# Patient Record
Sex: MALE | Race: WHITE | Employment: OTHER | URBAN - METROPOLITAN AREA
[De-identification: names, ages, dates, MRNs, and addresses within clinical notes are randomized per-mention and may not be internally consistent; named-entity substitution may affect disease eponyms.]

---

## 2020-06-16 ENCOUNTER — HOSPITAL ENCOUNTER (EMERGENCY)
Age: 65
Discharge: HOME | End: 2020-06-17
Attending: EMERGENCY MEDICINE
Payer: COMMERCIAL

## 2020-06-16 PROCEDURE — 99284 EMERGENCY DEPT VISIT MOD MDM: CPT

## 2020-06-16 RX ORDER — DUTASTERIDE 0.5 MG/1
0.5 CAPSULE, LIQUID FILLED ORAL DAILY
COMMUNITY
End: 2021-08-09

## 2020-06-16 RX ORDER — M-VIT,TX,IRON,MINS/CALC/FOLIC 27MG-0.4MG
1 TABLET ORAL DAILY
COMMUNITY

## 2020-06-16 RX ORDER — TADALAFIL 5 MG/1
5 TABLET ORAL PRN
COMMUNITY

## 2020-06-16 SDOH — HEALTH STABILITY: MENTAL HEALTH: HOW OFTEN DO YOU HAVE A DRINK CONTAINING ALCOHOL?: NEVER

## 2020-06-17 ENCOUNTER — APPOINTMENT (OUTPATIENT)
Dept: GENERAL RADIOLOGY | Age: 65
End: 2020-06-17
Payer: COMMERCIAL

## 2020-06-17 ENCOUNTER — TELEPHONE (OUTPATIENT)
Dept: GASTROENTEROLOGY | Age: 65
End: 2020-06-17

## 2020-06-17 VITALS
TEMPERATURE: 99 F | WEIGHT: 202 LBS | BODY MASS INDEX: 28.92 KG/M2 | HEART RATE: 67 BPM | DIASTOLIC BLOOD PRESSURE: 96 MMHG | RESPIRATION RATE: 16 BRPM | HEIGHT: 70 IN | SYSTOLIC BLOOD PRESSURE: 169 MMHG | OXYGEN SATURATION: 97 %

## 2020-06-17 LAB
ABSOLUTE EOS #: 0.2 K/UL (ref 0–0.4)
ABSOLUTE IMMATURE GRANULOCYTE: ABNORMAL K/UL (ref 0–0.3)
ABSOLUTE LYMPH #: 1 K/UL (ref 1–4.8)
ABSOLUTE MONO #: 0.7 K/UL (ref 0.1–1.3)
ALBUMIN SERPL-MCNC: 4.2 G/DL (ref 3.5–5.2)
ALBUMIN/GLOBULIN RATIO: NORMAL (ref 1–2.5)
ALP BLD-CCNC: 55 U/L (ref 40–129)
ALT SERPL-CCNC: 18 U/L (ref 5–41)
ANION GAP SERPL CALCULATED.3IONS-SCNC: 12 MMOL/L (ref 9–17)
AST SERPL-CCNC: 16 U/L
BASOPHILS # BLD: 1 % (ref 0–2)
BASOPHILS ABSOLUTE: 0 K/UL (ref 0–0.2)
BILIRUB SERPL-MCNC: 0.34 MG/DL (ref 0.3–1.2)
BILIRUBIN DIRECT: 0.1 MG/DL
BILIRUBIN, INDIRECT: 0.24 MG/DL (ref 0–1)
BNP INTERPRETATION: NORMAL
BUN BLDV-MCNC: 22 MG/DL (ref 8–23)
BUN/CREAT BLD: ABNORMAL (ref 9–20)
CALCIUM SERPL-MCNC: 9.2 MG/DL (ref 8.6–10.4)
CHLORIDE BLD-SCNC: 105 MMOL/L (ref 98–107)
CO2: 22 MMOL/L (ref 20–31)
CREAT SERPL-MCNC: 0.79 MG/DL (ref 0.7–1.2)
DIFFERENTIAL TYPE: ABNORMAL
EKG ATRIAL RATE: 62 BPM
EKG P AXIS: 61 DEGREES
EKG P-R INTERVAL: 172 MS
EKG Q-T INTERVAL: 432 MS
EKG QRS DURATION: 84 MS
EKG QTC CALCULATION (BAZETT): 438 MS
EKG R AXIS: 62 DEGREES
EKG T AXIS: 64 DEGREES
EKG VENTRICULAR RATE: 62 BPM
EOSINOPHILS RELATIVE PERCENT: 3 % (ref 0–4)
GFR AFRICAN AMERICAN: >60 ML/MIN
GFR NON-AFRICAN AMERICAN: >60 ML/MIN
GFR SERPL CREATININE-BSD FRML MDRD: ABNORMAL ML/MIN/{1.73_M2}
GFR SERPL CREATININE-BSD FRML MDRD: ABNORMAL ML/MIN/{1.73_M2}
GLOBULIN: NORMAL G/DL (ref 1.5–3.8)
GLUCOSE BLD-MCNC: 139 MG/DL (ref 70–99)
HCT VFR BLD CALC: 47.7 % (ref 41–53)
HEMOGLOBIN: 16.1 G/DL (ref 13.5–17.5)
IMMATURE GRANULOCYTES: ABNORMAL %
LIPASE: 35 U/L (ref 13–60)
LYMPHOCYTES # BLD: 17 % (ref 24–44)
MAGNESIUM: 2.3 MG/DL (ref 1.6–2.6)
MCH RBC QN AUTO: 30.1 PG (ref 26–34)
MCHC RBC AUTO-ENTMCNC: 33.8 G/DL (ref 31–37)
MCV RBC AUTO: 89.1 FL (ref 80–100)
MONOCYTES # BLD: 11 % (ref 1–7)
NRBC AUTOMATED: ABNORMAL PER 100 WBC
PDW BLD-RTO: 13.4 % (ref 11.5–14.9)
PLATELET # BLD: 157 K/UL (ref 150–450)
PLATELET ESTIMATE: ABNORMAL
PMV BLD AUTO: 8.9 FL (ref 6–12)
POTASSIUM SERPL-SCNC: 4.1 MMOL/L (ref 3.7–5.3)
PRO-BNP: <20 PG/ML
RBC # BLD: 5.36 M/UL (ref 4.5–5.9)
RBC # BLD: ABNORMAL 10*6/UL
SEG NEUTROPHILS: 68 % (ref 36–66)
SEGMENTED NEUTROPHILS ABSOLUTE COUNT: 4.1 K/UL (ref 1.3–9.1)
SODIUM BLD-SCNC: 139 MMOL/L (ref 135–144)
TOTAL PROTEIN: 6.5 G/DL (ref 6.4–8.3)
TROPONIN INTERP: NORMAL
TROPONIN T: NORMAL NG/ML
TROPONIN, HIGH SENSITIVITY: <6 NG/L (ref 0–22)
WBC # BLD: 6 K/UL (ref 3.5–11)
WBC # BLD: ABNORMAL 10*3/UL

## 2020-06-17 PROCEDURE — 2580000003 HC RX 258: Performed by: EMERGENCY MEDICINE

## 2020-06-17 PROCEDURE — 80048 BASIC METABOLIC PNL TOTAL CA: CPT

## 2020-06-17 PROCEDURE — 36415 COLL VENOUS BLD VENIPUNCTURE: CPT

## 2020-06-17 PROCEDURE — 93005 ELECTROCARDIOGRAM TRACING: CPT | Performed by: EMERGENCY MEDICINE

## 2020-06-17 PROCEDURE — 71046 X-RAY EXAM CHEST 2 VIEWS: CPT

## 2020-06-17 PROCEDURE — 80076 HEPATIC FUNCTION PANEL: CPT

## 2020-06-17 PROCEDURE — 85025 COMPLETE CBC W/AUTO DIFF WBC: CPT

## 2020-06-17 PROCEDURE — 93010 ELECTROCARDIOGRAM REPORT: CPT | Performed by: INTERNAL MEDICINE

## 2020-06-17 PROCEDURE — 2500000003 HC RX 250 WO HCPCS: Performed by: EMERGENCY MEDICINE

## 2020-06-17 PROCEDURE — 96374 THER/PROPH/DIAG INJ IV PUSH: CPT

## 2020-06-17 PROCEDURE — 83735 ASSAY OF MAGNESIUM: CPT

## 2020-06-17 PROCEDURE — 6370000000 HC RX 637 (ALT 250 FOR IP): Performed by: EMERGENCY MEDICINE

## 2020-06-17 PROCEDURE — 84484 ASSAY OF TROPONIN QUANT: CPT

## 2020-06-17 PROCEDURE — 83880 ASSAY OF NATRIURETIC PEPTIDE: CPT

## 2020-06-17 PROCEDURE — 83690 ASSAY OF LIPASE: CPT

## 2020-06-17 RX ORDER — PANTOPRAZOLE SODIUM 20 MG/1
20 TABLET, DELAYED RELEASE ORAL DAILY
Qty: 15 TABLET | Refills: 0 | Status: SHIPPED | OUTPATIENT
Start: 2020-06-17 | End: 2020-06-18

## 2020-06-17 RX ORDER — 0.9 % SODIUM CHLORIDE 0.9 %
1000 INTRAVENOUS SOLUTION INTRAVENOUS ONCE
Status: COMPLETED | OUTPATIENT
Start: 2020-06-17 | End: 2020-06-17

## 2020-06-17 RX ORDER — PANTOPRAZOLE SODIUM 20 MG/1
20 TABLET, DELAYED RELEASE ORAL DAILY
Qty: 15 TABLET | Refills: 0 | Status: SHIPPED | OUTPATIENT
Start: 2020-06-17 | End: 2020-06-17 | Stop reason: SDUPTHER

## 2020-06-17 RX ORDER — MAGNESIUM HYDROXIDE/ALUMINUM HYDROXICE/SIMETHICONE 120; 1200; 1200 MG/30ML; MG/30ML; MG/30ML
30 SUSPENSION ORAL ONCE
Status: COMPLETED | OUTPATIENT
Start: 2020-06-17 | End: 2020-06-17

## 2020-06-17 RX ORDER — LIDOCAINE HYDROCHLORIDE 20 MG/ML
15 SOLUTION OROPHARYNGEAL ONCE
Status: COMPLETED | OUTPATIENT
Start: 2020-06-17 | End: 2020-06-17

## 2020-06-17 RX ADMIN — LIDOCAINE HYDROCHLORIDE 15 ML: 20 SOLUTION ORAL; TOPICAL at 00:42

## 2020-06-17 RX ADMIN — FAMOTIDINE 20 MG: 10 INJECTION, SOLUTION INTRAVENOUS at 00:42

## 2020-06-17 RX ADMIN — ALUMINUM HYDROXIDE, MAGNESIUM HYDROXIDE, AND SIMETHICONE 30 ML: 200; 200; 20 SUSPENSION ORAL at 00:42

## 2020-06-17 RX ADMIN — SODIUM CHLORIDE 1000 ML: 9 INJECTION, SOLUTION INTRAVENOUS at 00:42

## 2020-06-17 ASSESSMENT — ENCOUNTER SYMPTOMS
COUGH: 0
NAUSEA: 1
BACK PAIN: 0
SHORTNESS OF BREATH: 0
VOMITING: 0
ABDOMINAL PAIN: 1
DIARRHEA: 0

## 2020-06-17 NOTE — TELEPHONE ENCOUNTER
Received a call back and scheduled for 6/18/20 1 pm at the [facility]. Ins, id, co-pay, mask, no more than 5 minutes early, and preferably alone due to covid-19. Writer thanked and call ended.

## 2020-06-17 NOTE — TELEPHONE ENCOUNTER
Received referral, called patient to schedule but unfortunately it went to his .  Patient referral has a DX of Anna Sanchez and is referred to [facility] per PCP

## 2020-06-17 NOTE — ED PROVIDER NOTES
EMERGENCY DEPARTMENT ENCOUNTER    Pt Name: Elizabeth Jiménez  MRN: [id number]  [place] 1955  Date of evaluation: 6/16/20  CHIEF COMPLAINT       Chief Complaint   Patient presents with    Abdominal Pain    Nausea    Emesis     HISTORY OF PRESENT ILLNESS   HPI    This is a 71-year-old male with a history of diabetes who comes in today with epigastric abdominal pain. The patient states that this is chronic in nature. He states that he had this 2 years ago but then he started wearing bracelets and it went away. But recently he has had epigastric abdominal pain after eating. Today he ate a roast as well as some broccoli and he felt severe epigastric abdominal pain he went to the toilet and felt nauseous but did not actually vomit. Patient states that by the time EMS arrived he no longer had abdominal pain. Patient states that he has intermittently having diarrhea which he attributes to eating cereal and almond milk he states that when he was on a cruise for 2 weeks in February he no longer had the diarrhea but now he has the diarrhea intermittently again. He denies any fevers or chills no chest pain shortness of breath. REVIEW OF SYSTEMS     Review of Systems   Constitutional: Negative for fever. HENT: Negative for congestion. Respiratory: Negative for cough and shortness of breath. Cardiovascular: Negative for chest pain. Gastrointestinal: Positive for abdominal pain and nausea. Negative for diarrhea and vomiting. Genitourinary: Negative for dysuria. Musculoskeletal: Negative for back pain. Skin: Negative for rash. Neurological: Negative for headaches. All other systems reviewed and are negative.     PASTMEDICAL HISTORY     Past Medical History:   Diagnosis Date    Diabetes mellitus ([address]      SURGICAL HISTORY       Past Surgical History:   Procedure Laterality Date    CHOLECYSTECTOMY      HERNIA REPAIR      KNEE ARTHROSCOPY Right 2017     CURRENT MEDICATIONS       Previous

## 2020-06-18 ENCOUNTER — OFFICE VISIT (OUTPATIENT)
Dept: GASTROENTEROLOGY | Age: 65
End: 2020-06-18
Payer: COMMERCIAL

## 2020-06-18 VITALS — BODY MASS INDEX: 28.84 KG/M2 | TEMPERATURE: 98.2 F | WEIGHT: 201 LBS

## 2020-06-18 PROCEDURE — 99204 OFFICE O/P NEW MOD 45 MIN: CPT | Performed by: INTERNAL MEDICINE

## 2020-06-18 RX ORDER — OMEPRAZOLE 20 MG/1
20 CAPSULE, DELAYED RELEASE ORAL
Qty: 30 CAPSULE | Refills: 0 | Status: SHIPPED | OUTPATIENT
Start: 2020-06-18 | End: 2020-06-18

## 2020-06-18 RX ORDER — PANTOPRAZOLE SODIUM 20 MG/1
20 TABLET, DELAYED RELEASE ORAL DAILY
Qty: 30 TABLET | Refills: 3 | Status: SHIPPED | OUTPATIENT
Start: 2020-06-18 | End: 2020-10-13

## 2020-06-18 ASSESSMENT — ENCOUNTER SYMPTOMS
ANAL BLEEDING: 0
CHOKING: 0
VOICE CHANGE: 0
SORE THROAT: 0
RECTAL PAIN: 0
NAUSEA: 1
DIARRHEA: 1
ABDOMINAL PAIN: 0
ALLERGIC/IMMUNOLOGIC NEGATIVE: 1
BLOOD IN STOOL: 0
CONSTIPATION: 0
WHEEZING: 0
SINUS PRESSURE: 0
BACK PAIN: 0
COUGH: 0
VOMITING: 0
RESPIRATORY NEGATIVE: 1
TROUBLE SWALLOWING: 0
ABDOMINAL DISTENTION: 1

## 2020-06-18 NOTE — PROGRESS NOTES
Subjective:      Patient ID: Renee Berumen is a 60 y.o. male. HPI  Dr. Salina Melton has requested that I see Renee Berumen for a consult for No diagnosis found. .  Patient seen with a history of substernal discomfort. Patient states that in the last couple of months he is having intermittent substernal discomfort. Usually while eating food. He  thinks that while eating bread or solid food he has the issue. Usually this last for a.  Of 20 to 30 minutes. At that time he gets mild diaphoresis. No radiation of the discomfort. This discomfort is not activity induced. No prior history of angina. In fact, he does do heavy work, and at that time he does not have symptoms. He has good appetite. Denies heartburns. No dyspeptic symptoms. No history of NSAID use. No weight loss. He does have abdominal bloating, cramps, intermittent loose bowels alternating with constipation. Diarrhea appears to be predominant. No melena or hematochezia. Patient had a similar symptoms of substernal discomfort years ago. At that time he was told to use some metallic rings on his hands, and with that intervention symptoms resolved. No prior history of esophagitis, ulcer disease. He was seen in the emergency department recently with the symptoms and subsequently referred for GI evaluation. Past Medical, Family, and Social History reviewed and does contribute to the patient presenting condition. patient\"s PMH/PSH,SH,PSYCH hx, MEDs, ALLERGIES, and ROS was all reviewed and updated ion the appropriate sections  Review of Systems   Constitutional: Positive for fatigue. Negative for appetite change and unexpected weight change. HENT: Positive for postnasal drip. Negative for dental problem, sinus pressure, sore throat, trouble swallowing and voice change. Eyes: Positive for visual disturbance. Respiratory: Negative. Negative for cough, choking and wheezing.     Cardiovascular: Positive for chest

## 2020-06-18 NOTE — TELEPHONE ENCOUNTER
Joel Bateman stated at check out today that he received a notice from his pharmacy that his insurance is requesting an alternative for omeprazole.

## 2020-06-22 ENCOUNTER — HOSPITAL ENCOUNTER (OUTPATIENT)
Dept: PREADMISSION TESTING | Age: 65
Setting detail: SPECIMEN
Discharge: HOME | End: 2020-06-26
Payer: COMMERCIAL

## 2020-06-22 PROCEDURE — U0004 COV-19 TEST NON-CDC HGH THRU: HCPCS

## 2020-06-23 LAB
SARS-COV-2, PCR: NOT DETECTED
SARS-COV-2, RAPID: NORMAL
SARS-COV-2: NORMAL
SOURCE: NORMAL

## 2020-06-24 ENCOUNTER — HOSPITAL ENCOUNTER (OUTPATIENT)
Dept: NON INVASIVE DIAGNOSTICS | Age: 65
Discharge: HOME | End: 2020-06-24
Payer: COMMERCIAL

## 2020-06-24 ENCOUNTER — TELEPHONE (OUTPATIENT)
Dept: PRIMARY CARE CLINIC | Age: 65
End: 2020-06-24

## 2020-06-24 PROCEDURE — 93017 CV STRESS TEST TRACING ONLY: CPT

## 2020-06-24 PROCEDURE — 2580000003 HC RX 258: Performed by: INTERNAL MEDICINE

## 2020-06-24 RX ORDER — SODIUM CHLORIDE 0.9 % (FLUSH) 0.9 %
10 SYRINGE (ML) INJECTION 2 TIMES DAILY
Status: DISCONTINUED | OUTPATIENT
Start: 2020-06-24 | End: 2020-06-25 | Stop reason: HOSPADM

## 2020-06-24 RX ADMIN — Medication 10 ML: at 09:13

## 2020-06-25 ENCOUNTER — TELEPHONE (OUTPATIENT)
Dept: GASTROENTEROLOGY | Age: 65
End: 2020-06-25

## 2020-06-26 ENCOUNTER — HOSPITAL ENCOUNTER (OUTPATIENT)
Age: 65
Setting detail: OUTPATIENT SURGERY
Discharge: HOME | End: 2020-06-26
Attending: INTERNAL MEDICINE | Admitting: INTERNAL MEDICINE
Payer: COMMERCIAL

## 2020-06-26 ENCOUNTER — ANESTHESIA (OUTPATIENT)
Dept: ENDOSCOPY | Age: 65
End: 2020-06-26
Payer: COMMERCIAL

## 2020-06-26 ENCOUNTER — ANESTHESIA EVENT (OUTPATIENT)
Dept: ENDOSCOPY | Age: 65
End: 2020-06-26
Payer: COMMERCIAL

## 2020-06-26 VITALS
OXYGEN SATURATION: 97 % | HEART RATE: 56 BPM | WEIGHT: 201 LBS | DIASTOLIC BLOOD PRESSURE: 78 MMHG | SYSTOLIC BLOOD PRESSURE: 160 MMHG | HEIGHT: 70 IN | RESPIRATION RATE: 16 BRPM | TEMPERATURE: 97.5 F | BODY MASS INDEX: 28.77 KG/M2

## 2020-06-26 VITALS — DIASTOLIC BLOOD PRESSURE: 76 MMHG | SYSTOLIC BLOOD PRESSURE: 177 MMHG | OXYGEN SATURATION: 95 %

## 2020-06-26 LAB — GLUCOSE BLD-MCNC: 128 MG/DL (ref 75–110)

## 2020-06-26 PROCEDURE — 3609017700 HC EGD DILATION GASTRIC/DUODENAL STRICTURE: Performed by: INTERNAL MEDICINE

## 2020-06-26 PROCEDURE — 3700000001 HC ADD 15 MINUTES (ANESTHESIA): Performed by: INTERNAL MEDICINE

## 2020-06-26 PROCEDURE — 7100000001 HC PACU RECOVERY - ADDTL 15 MIN: Performed by: INTERNAL MEDICINE

## 2020-06-26 PROCEDURE — 43450 DILATE ESOPHAGUS 1/MULT PASS: CPT | Performed by: INTERNAL MEDICINE

## 2020-06-26 PROCEDURE — 88305 TISSUE EXAM BY PATHOLOGIST: CPT

## 2020-06-26 PROCEDURE — 7100000000 HC PACU RECOVERY - FIRST 15 MIN: Performed by: INTERNAL MEDICINE

## 2020-06-26 PROCEDURE — 2580000003 HC RX 258: Performed by: INTERNAL MEDICINE

## 2020-06-26 PROCEDURE — 82947 ASSAY GLUCOSE BLOOD QUANT: CPT

## 2020-06-26 PROCEDURE — 2709999900 HC NON-CHARGEABLE SUPPLY: Performed by: INTERNAL MEDICINE

## 2020-06-26 PROCEDURE — 43239 EGD BIOPSY SINGLE/MULTIPLE: CPT | Performed by: INTERNAL MEDICINE

## 2020-06-26 PROCEDURE — 6360000002 HC RX W HCPCS: Performed by: NURSE ANESTHETIST, CERTIFIED REGISTERED

## 2020-06-26 PROCEDURE — 3700000000 HC ANESTHESIA ATTENDED CARE: Performed by: INTERNAL MEDICINE

## 2020-06-26 RX ORDER — ONDANSETRON 2 MG/ML
4 INJECTION INTRAMUSCULAR; INTRAVENOUS
Status: DISCONTINUED | OUTPATIENT
Start: 2020-06-26 | End: 2020-06-26 | Stop reason: HOSPADM

## 2020-06-26 RX ORDER — MEPERIDINE HYDROCHLORIDE 25 MG/ML
12.5 INJECTION INTRAMUSCULAR; INTRAVENOUS; SUBCUTANEOUS EVERY 5 MIN PRN
Status: DISCONTINUED | OUTPATIENT
Start: 2020-06-26 | End: 2020-06-26 | Stop reason: HOSPADM

## 2020-06-26 RX ORDER — LABETALOL 20 MG/4 ML (5 MG/ML) INTRAVENOUS SYRINGE
5 EVERY 10 MIN PRN
Status: DISCONTINUED | OUTPATIENT
Start: 2020-06-26 | End: 2020-06-26 | Stop reason: HOSPADM

## 2020-06-26 RX ORDER — PROPOFOL 10 MG/ML
INJECTION, EMULSION INTRAVENOUS PRN
Status: DISCONTINUED | OUTPATIENT
Start: 2020-06-26 | End: 2020-06-26 | Stop reason: SDUPTHER

## 2020-06-26 RX ORDER — DIPHENHYDRAMINE HYDROCHLORIDE 50 MG/ML
12.5 INJECTION INTRAMUSCULAR; INTRAVENOUS
Status: DISCONTINUED | OUTPATIENT
Start: 2020-06-26 | End: 2020-06-26 | Stop reason: HOSPADM

## 2020-06-26 RX ORDER — ZOLPIDEM TARTRATE 5 MG/1
5 TABLET ORAL
COMMUNITY
End: 2021-11-30 | Stop reason: DRUGHIGH

## 2020-06-26 RX ORDER — SODIUM CHLORIDE, SODIUM LACTATE, POTASSIUM CHLORIDE, CALCIUM CHLORIDE 600; 310; 30; 20 MG/100ML; MG/100ML; MG/100ML; MG/100ML
INJECTION, SOLUTION INTRAVENOUS CONTINUOUS
Status: DISCONTINUED | OUTPATIENT
Start: 2020-06-26 | End: 2020-06-26 | Stop reason: HOSPADM

## 2020-06-26 RX ORDER — MORPHINE SULFATE 2 MG/ML
2 INJECTION, SOLUTION INTRAMUSCULAR; INTRAVENOUS EVERY 5 MIN PRN
Status: DISCONTINUED | OUTPATIENT
Start: 2020-06-26 | End: 2020-06-26 | Stop reason: HOSPADM

## 2020-06-26 RX ADMIN — SODIUM CHLORIDE, POTASSIUM CHLORIDE, SODIUM LACTATE AND CALCIUM CHLORIDE: 600; 310; 30; 20 INJECTION, SOLUTION INTRAVENOUS at 07:43

## 2020-06-26 RX ADMIN — PROPOFOL 50 MG: 10 INJECTION, EMULSION INTRAVENOUS at 07:47

## 2020-06-26 RX ADMIN — PROPOFOL 50 MG: 10 INJECTION, EMULSION INTRAVENOUS at 07:44

## 2020-06-26 RX ADMIN — PROPOFOL 50 MG: 10 INJECTION, EMULSION INTRAVENOUS at 07:51

## 2020-06-26 RX ADMIN — PROPOFOL 50 MG: 10 INJECTION, EMULSION INTRAVENOUS at 07:46

## 2020-06-26 RX ADMIN — SODIUM CHLORIDE, POTASSIUM CHLORIDE, SODIUM LACTATE AND CALCIUM CHLORIDE: 600; 310; 30; 20 INJECTION, SOLUTION INTRAVENOUS at 06:39

## 2020-06-26 RX ADMIN — PROPOFOL 50 MG: 10 INJECTION, EMULSION INTRAVENOUS at 07:50

## 2020-06-26 ASSESSMENT — PULMONARY FUNCTION TESTS
PIF_VALUE: 0
PIF_VALUE: 1
PIF_VALUE: 0
PIF_VALUE: 1
PIF_VALUE: 0

## 2020-06-26 ASSESSMENT — PAIN SCALES - GENERAL: PAINLEVEL_OUTOF10: 0

## 2020-06-26 ASSESSMENT — ENCOUNTER SYMPTOMS
CONSTIPATION: 0
VOMITING: 0
STRIDOR: 0
SHORTNESS OF BREATH: 0
COUGH: 0
ABDOMINAL PAIN: 1
DIARRHEA: 1
ABDOMINAL DISTENTION: 1
WHEEZING: 0
NAUSEA: 1
SHORTNESS OF BREATH: 0

## 2020-06-26 ASSESSMENT — LIFESTYLE VARIABLES: SMOKING_STATUS: 0

## 2020-06-26 ASSESSMENT — PAIN - FUNCTIONAL ASSESSMENT: PAIN_FUNCTIONAL_ASSESSMENT: 0-10

## 2020-06-26 NOTE — H&P
HISTORY and Susi Mukherjee [id number]       NAME:  Lynda Fagan  MRN: [id number]   YOB: 1955   Date: 6/26/2020   Age: 60 y.o. Gender: male       COMPLAINT AND PRESENT HISTORY:              Lynda Fagan is 60 y.o.,  male, here for EGD. Patient C/O of frequent heartburn, epigastric pains, nausea worsening in the last 2-3 weeks. No diarrhea / constipation, no changes in the color, caliber or consistency of the stools. No fever or chills. He reports the symptoms worsened last week and he had his wife call 911. Pt reports he had chest discomfort with eating and went to ED. He reports he had stress testing which pt reports were normal.    Pt went on a cruise in Feb, he had recurrent diarrhea after travel. Pt reports he saw Dr. Masoud Bean 6/18/2020, states no change in health since office visit.      PAST MEDICAL HISTORY     Past Medical History:   Diagnosis Date    Diabetes mellitus ([facility] [address]     prediabetes    History of BPH     Mitral valve prolapse        SURGICAL HISTORY       Past Surgical History:   Procedure Laterality Date    CHOLECYSTECTOMY      HERNIA REPAIR      x 3    KNEE ARTHROSCOPY Right 2017         SOCIAL HISTORY       Social History     Socioeconomic History    Marital status: Single     Spouse name: None    Number of children: None    Years of education: None    Highest education level: None   Occupational History    None   Social Needs    Financial resource strain: None    Food insecurity     Worry: None     Inability: None    Transportation needs     Medical: None     Non-medical: None   Tobacco Use    Smoking status: Never Smoker    Smokeless tobacco: Never Used   Substance and Sexual Activity    Alcohol use: Never     Frequency: Never    Drug use: Never    Sexual activity: None   Lifestyle    Physical activity     Days per week: None     Minutes per session: None    Stress: None   Relationships    Social connections     Talks on distress or pain at this time. SKIN:  Warm, dry, no cyanosis or jaundice. HEAD:  Normocephalic, atraumatic, no swelling or tenderness. EYES:  Conjunctiva clear sclera white, wears glasses. EARS:  No discharge, no marked hearing loss. NOSE:  No rhinorrhea, epistaxis or septal deformity. THROAT:  Uvula midline. No ulceration bleeding or discharge. NECK:  No stiffness, trachea central.          CHEST:  Symmetrical and equal on expansion. HEART:  Hypertensive. RRR S1 > S2. No audible murmurs or gallops. LUNGS:  Equal on expansion, normal breath sounds. ABDOMEN:  oft on palpation. Tenderness to epigastrium. Normoactive BS. No guarding or rigidity. No palpable organomegaly. LOCOMOTOR, BACK AND SPINE:  No tenderness or deformities. EXTREMITIES:  Symmetrical, no pedal edema. No calf tenderness. No discoloration or ulcerations. NEUROLOGIC:  The patient is conscious, alert, oriented, No apparent focal sensory or motor deficits. PROVISIONAL DIAGNOSES / SURGERY:      Substernal discomfort.    EGD    MICHAEL Garcia - CNP on 6/26/2020 at 6:33 AM

## 2020-06-26 NOTE — ANESTHESIA POSTPROCEDURE EVALUATION
POST- ANESTHESIA EVALUATION       Pt Name: Martita Barakat  MRN: [id number]  YOB: 1955  Date of evaluation: 6/26/2020  Time:  9:13 AM      BP (!) 160/78   Pulse 56   Temp 97.5 °F (36.4 °C) (Infrared)   Resp 16   Ht 5' 10\" (1.778 m)   Wt 201 lb (91.2 kg)   SpO2 97%   BMI 28.84 kg/m²      Consciousness Level  Awake  Cardiopulmonary Status  Stable  Pain Adequately Treated YES  Nausea / Vomiting  NO  Adequate Hydration  YES  Anesthesia Related Complications NONE      Electronically signed by Steven Disla MD on 6/26/2020 at 9:13 AM       Department of Anesthesiology  Postprocedure Note    Patient: Martita Barakat  MRN: [id number]  YOB: 1955  Date of evaluation: 6/26/2020  Time:  9:13 AM     Procedure Summary     Date:  06/26/20 Room / Location:  [address] [place] [address] 03 / [address] [place] [address] ENDO    Anesthesia Start:  3989 Anesthesia Stop:  0801    Procedure:  EGD DILATION & BIOPSY (N/A Esophagus) Diagnosis:  (SUBSTERNAL DISCOMFORT          PAT ON ADMIT PER ANES)    Surgeon:  Jerica Reyes MD Responsible Provider:  Steven Disla MD    Anesthesia Type:  MAC, general ASA Status:  2          Anesthesia Type: MAC, general    Sandy Phase I: Sandy Score: 10    Sandy Phase II:      Last vitals: Reviewed and per EMR flowsheets.        Anesthesia Post Evaluation

## 2020-06-29 LAB — SURGICAL PATHOLOGY REPORT: NORMAL

## 2020-06-29 NOTE — PROCEDURES
[address] [address]                              CARDIAC STRESS TEST    PATIENT NAME: Kike Tellez                   :        1955  MED REC NO:   [id number]                              ROOM:  ACCOUNT NO:   [de-identified]                           ADMIT DATE: 2020  PROVIDER:     Treasure Bettencourt    DATE OF STUDY:  2020    TEST TYPE: TREADMILL STRESS TEST  INDICATION: CHEST PAIN, PRE-OP  REFERRING PHYSICIAN: Kelli Wright    100% MAX PREDICTED HEART RATE: 156 BEATS PER MINUTE  85% MAX PREDICTED HEART RATE: 132 BEATS PER MINUTE  RESTING HEART RATE: 55 BEATS PER MINUTE  MAXIMUM HEART RATE ACHIEVED: 139 BEATS PER MINUTE  PERCENTAGE OF AGE PREDICTED MAXIMUM ACHIEVED: 89%  RESTING BLOOD PRESSURE: 156/82  PEAK BLOOD PRESSURE: 219/83  METS: 10.1    MEDICATION(S) GIVEN: NONE  REASON FOR TERMINATION: 85% OF MAXIMUM PREDICTED HEART RATE ACHIEVED,  FATIGUE  TOTAL TIME ON TREADMILL: 8:19    RESTING EKG: NORMAL  STRESS HEART RESPONSE: NORMAL RESPONSE  BLOOD PRESSURE RESPONSE: HYPER  STRESS EKGs: NORMAL  CHEST DISCOMFORT: NO PAIN DURING STRESS  ISCHEMIC EKG CHANGES: NONE    EKG IMPRESSION: ELECTROCARDIOGRAPHICALLY NEGATIVE TREADMILL STRESS TEST. COMMENTS: ONE VENTRICULAR COUPLET, MAXIMUM BLOOD PRESSURE 219/83.     Edwin Ballard    D: 2020 13:39:10       T: 2020 13:42:54     /NAIN  Job#: [id number]     Doc#: Unknown    CC:    (Retain this field even if not dictated or not decipherable)

## 2020-07-09 ENCOUNTER — TELEPHONE (OUTPATIENT)
Dept: GASTROENTEROLOGY | Age: 65
End: 2020-07-09

## 2020-07-14 ENCOUNTER — OFFICE VISIT (OUTPATIENT)
Dept: GASTROENTEROLOGY | Age: 65
End: 2020-07-14
Payer: COMMERCIAL

## 2020-07-14 VITALS — WEIGHT: 206.1 LBS | BODY MASS INDEX: 29.57 KG/M2

## 2020-07-14 PROCEDURE — 99213 OFFICE O/P EST LOW 20 MIN: CPT | Performed by: NURSE PRACTITIONER

## 2020-07-14 ASSESSMENT — ENCOUNTER SYMPTOMS
COUGH: 0
ALLERGIC/IMMUNOLOGIC NEGATIVE: 1
WHEEZING: 0
CONSTIPATION: 0
BLOOD IN STOOL: 0
TROUBLE SWALLOWING: 0
DIARRHEA: 1
VOICE CHANGE: 0
SINUS PRESSURE: 0
RESPIRATORY NEGATIVE: 1
CHOKING: 0
ABDOMINAL PAIN: 0
NAUSEA: 0
BACK PAIN: 0
ABDOMINAL DISTENTION: 0
VOMITING: 0
SORE THROAT: 0
ANAL BLEEDING: 0
RECTAL PAIN: 0

## 2020-07-14 NOTE — PROGRESS NOTES
mg by mouth daily, Disp: , Rfl:     tadalafil (CIALIS) 5 MG tablet, Take 5 mg by mouth as needed for Erectile Dysfunction, Disp: , Rfl:     ALLERGIES:   No Known Allergies    FAMILY HISTORY: History reviewed. No pertinent family history. SOCIAL HISTORY:   Social History     Socioeconomic History    Marital status: Single     Spouse name: Not on file    Number of children: Not on file    Years of education: Not on file    Highest education level: Not on file   Occupational History    Not on file   Social Needs    Financial resource strain: Not on file    Food insecurity     Worry: Not on file     Inability: Not on file    Transportation needs     Medical: Not on file     Non-medical: Not on file   Tobacco Use    Smoking status: Never Smoker    Smokeless tobacco: Never Used   Substance and Sexual Activity    Alcohol use: Never     Frequency: Never    Drug use: Never    Sexual activity: Not on file   Lifestyle    Physical activity     Days per week: Not on file     Minutes per session: Not on file    Stress: Not on file   Relationships    Social connections     Talks on phone: Not on file     Gets together: Not on file     Attends Yarsanism service: Not on file     Active member of club or organization: Not on file     Attends meetings of clubs or organizations: Not on file     Relationship status: Not on file    Intimate partner violence     Fear of current or ex partner: Not on file     Emotionally abused: Not on file     Physically abused: Not on file     Forced sexual activity: Not on file   Other Topics Concern    Not on file   Social History Narrative    Not on file       REVIEW OF SYSTEMS: A 12-point review of systemswas obtained and pertinent positives and negatives were enumerated above in the history of present illness. All other reviewed systems / symptoms were negative. Review of Systems   Constitutional: Positive for fatigue.  Negative for appetite change and unexpected weight Pulmonary:      Effort: Pulmonary effort is normal. No respiratory distress. Breath sounds: Normal breath sounds. No wheezing or rales. Abdominal:      General: Bowel sounds are normal. There is no distension. Palpations: Abdomen is soft. There is no hepatomegaly or mass. Tenderness: There is no abdominal tenderness. There is no guarding or rebound. Hernia: No hernia is present. Lymphadenopathy:      Cervical: No cervical adenopathy. Skin:     General: Skin is warm and dry. Findings: No erythema or rash. Neurological:      Mental Status: He is alert and oriented to person, place, and time. Cranial Nerves: No cranial nerve deficit. Psychiatric:         Thought Content: Thought content normal.           LABORATORY DATA: Reviewed  Lab Results   Component Value Date    WBC 6.0 06/17/2020    HGB 16.1 06/17/2020    HCT 47.7 06/17/2020    MCV 89.1 06/17/2020     06/17/2020     06/17/2020    K 4.1 06/17/2020     06/17/2020    CO2 22 06/17/2020    BUN 22 06/17/2020    CREATININE 0.79 06/17/2020    LABALBU 4.2 06/17/2020    BILITOT 0.34 06/17/2020    ALKPHOS 55 06/17/2020    AST 16 06/17/2020    ALT 18 06/17/2020         Lab Results   Component Value Date    RBC 5.36 06/17/2020    HGB 16.1 06/17/2020    MCV 89.1 06/17/2020    MCH 30.1 06/17/2020    MCHC 33.8 06/17/2020    RDW 13.4 06/17/2020    MPV 8.9 06/17/2020    BASOPCT 1 06/17/2020    LYMPHSABS 1.00 06/17/2020    MONOSABS 0.70 06/17/2020    NEUTROABS 4.10 06/17/2020    EOSABS 0.20 06/17/2020    BASOSABS 0.00 06/17/2020         DIAGNOSTIC TESTING:     Xr Chest Standard (2 Vw)    Result Date: 6/17/2020  EXAMINATION: TWO XRAY VIEWS OF THE CHEST 6/17/2020 12:35 am COMPARISON: None. HISTORY: ORDERING SYSTEM PROVIDED HISTORY: epigastric pain TECHNOLOGIST PROVIDED HISTORY: epigastric pain Reason for Exam: Pt c/o pain in center of chest that began after eating dinner last night.  Acuity: Acute Type of Exam: Initial

## 2020-10-13 RX ORDER — PANTOPRAZOLE SODIUM 20 MG/1
TABLET, DELAYED RELEASE ORAL
Qty: 90 TABLET | Refills: 1 | Status: SHIPPED | OUTPATIENT
Start: 2020-10-13 | End: 2021-03-23

## 2021-08-09 ENCOUNTER — HOSPITAL ENCOUNTER (OUTPATIENT)
Dept: PREADMISSION TESTING | Age: 66
Discharge: HOME | End: 2021-08-13
Payer: MEDICARE

## 2021-08-09 VITALS — WEIGHT: 195 LBS | BODY MASS INDEX: 27.92 KG/M2 | HEIGHT: 70 IN

## 2021-08-09 RX ORDER — CICLOPIROX 7.7 MG/G
GEL TOPICAL 2 TIMES DAILY
Status: ON HOLD | COMMUNITY
End: 2021-11-25

## 2021-08-09 RX ORDER — ERGOCALCIFEROL 1.25 MG/1
50000 CAPSULE ORAL WEEKLY
COMMUNITY
End: 2021-11-30

## 2021-08-09 RX ORDER — BETAMETHASONE DIPROPIONATE 0.5 MG/G
CREAM TOPICAL 2 TIMES DAILY
Status: ON HOLD | COMMUNITY
End: 2021-11-25

## 2021-08-09 RX ORDER — FOLIC ACID 1 MG/1
1 TABLET ORAL DAILY
Status: ON HOLD | COMMUNITY
End: 2021-11-25

## 2021-08-09 RX ORDER — SILDENAFIL 100 MG/1
80 TABLET, FILM COATED ORAL PRN
Status: ON HOLD | COMMUNITY
End: 2021-11-25

## 2021-08-09 NOTE — PROGRESS NOTES

## 2021-08-19 ENCOUNTER — HOSPITAL ENCOUNTER (OUTPATIENT)
Dept: LAB | Age: 66
Setting detail: SPECIMEN
Discharge: HOME | End: 2021-08-19
Payer: MEDICARE

## 2021-08-19 DIAGNOSIS — Z01.818 PREOP TESTING: Primary | ICD-10-CM

## 2021-08-19 PROCEDURE — U0003 INFECTIOUS AGENT DETECTION BY NUCLEIC ACID (DNA OR RNA); SEVERE ACUTE RESPIRATORY SYNDROME CORONAVIRUS 2 (SARS-COV-2) (CORONAVIRUS DISEASE [COVID-19]), AMPLIFIED PROBE TECHNIQUE, MAKING USE OF HIGH THROUGHPUT TECHNOLOGIES AS DESCRIBED BY CMS-2020-01-R: HCPCS

## 2021-08-19 PROCEDURE — U0005 INFEC AGEN DETEC AMPLI PROBE: HCPCS

## 2021-08-20 ENCOUNTER — ANESTHESIA EVENT (OUTPATIENT)
Dept: ENDOSCOPY | Age: 66
End: 2021-08-20
Payer: MEDICARE

## 2021-08-20 LAB
SARS-COV-2: NORMAL
SARS-COV-2: NOT DETECTED
SOURCE: NORMAL

## 2021-08-23 ENCOUNTER — HOSPITAL ENCOUNTER (OUTPATIENT)
Age: 66
Setting detail: OUTPATIENT SURGERY
Discharge: HOME | End: 2021-08-23
Attending: SURGERY | Admitting: SURGERY
Payer: MEDICARE

## 2021-08-23 ENCOUNTER — ANESTHESIA (OUTPATIENT)
Dept: ENDOSCOPY | Age: 66
End: 2021-08-23
Payer: MEDICARE

## 2021-08-23 VITALS
RESPIRATION RATE: 16 BRPM | DIASTOLIC BLOOD PRESSURE: 79 MMHG | TEMPERATURE: 96.7 F | BODY MASS INDEX: 27.2 KG/M2 | OXYGEN SATURATION: 97 % | HEIGHT: 70 IN | SYSTOLIC BLOOD PRESSURE: 145 MMHG | HEART RATE: 66 BPM | WEIGHT: 190 LBS

## 2021-08-23 VITALS — DIASTOLIC BLOOD PRESSURE: 95 MMHG | SYSTOLIC BLOOD PRESSURE: 183 MMHG | OXYGEN SATURATION: 94 % | TEMPERATURE: 96.8 F

## 2021-08-23 LAB — GLUCOSE BLD-MCNC: 150 MG/DL (ref 75–110)

## 2021-08-23 PROCEDURE — 7100000030 HC ASPR PHASE II RECOVERY - FIRST 15 MIN: Performed by: SURGERY

## 2021-08-23 PROCEDURE — 7100000010 HC PHASE II RECOVERY - FIRST 15 MIN: Performed by: SURGERY

## 2021-08-23 PROCEDURE — 3700000000 HC ANESTHESIA ATTENDED CARE: Performed by: SURGERY

## 2021-08-23 PROCEDURE — 2500000003 HC RX 250 WO HCPCS

## 2021-08-23 PROCEDURE — 82947 ASSAY GLUCOSE BLOOD QUANT: CPT

## 2021-08-23 PROCEDURE — 6360000002 HC RX W HCPCS

## 2021-08-23 PROCEDURE — 7100000000 HC PACU RECOVERY - FIRST 15 MIN: Performed by: SURGERY

## 2021-08-23 PROCEDURE — 2580000003 HC RX 258: Performed by: ANESTHESIOLOGY

## 2021-08-23 PROCEDURE — 3700000001 HC ADD 15 MINUTES (ANESTHESIA): Performed by: SURGERY

## 2021-08-23 PROCEDURE — 7100000001 HC PACU RECOVERY - ADDTL 15 MIN: Performed by: SURGERY

## 2021-08-23 PROCEDURE — 2709999900 HC NON-CHARGEABLE SUPPLY: Performed by: SURGERY

## 2021-08-23 PROCEDURE — 7100000031 HC ASPR PHASE II RECOVERY - ADDTL 15 MIN: Performed by: SURGERY

## 2021-08-23 PROCEDURE — 3609027000 HC COLONOSCOPY: Performed by: SURGERY

## 2021-08-23 PROCEDURE — 7100000011 HC PHASE II RECOVERY - ADDTL 15 MIN: Performed by: SURGERY

## 2021-08-23 RX ORDER — DIPHENHYDRAMINE HYDROCHLORIDE 50 MG/ML
12.5 INJECTION INTRAMUSCULAR; INTRAVENOUS
Status: DISCONTINUED | OUTPATIENT
Start: 2021-08-23 | End: 2021-08-23 | Stop reason: HOSPADM

## 2021-08-23 RX ORDER — FENTANYL CITRATE 50 UG/ML
INJECTION, SOLUTION INTRAMUSCULAR; INTRAVENOUS PRN
Status: DISCONTINUED | OUTPATIENT
Start: 2021-08-23 | End: 2021-08-23 | Stop reason: SDUPTHER

## 2021-08-23 RX ORDER — ONDANSETRON 2 MG/ML
4 INJECTION INTRAMUSCULAR; INTRAVENOUS
Status: DISCONTINUED | OUTPATIENT
Start: 2021-08-23 | End: 2021-08-23 | Stop reason: HOSPADM

## 2021-08-23 RX ORDER — PROPOFOL 10 MG/ML
INJECTION, EMULSION INTRAVENOUS PRN
Status: DISCONTINUED | OUTPATIENT
Start: 2021-08-23 | End: 2021-08-23 | Stop reason: SDUPTHER

## 2021-08-23 RX ORDER — LABETALOL HYDROCHLORIDE 5 MG/ML
5 INJECTION, SOLUTION INTRAVENOUS EVERY 10 MIN PRN
Status: DISCONTINUED | OUTPATIENT
Start: 2021-08-23 | End: 2021-08-23 | Stop reason: HOSPADM

## 2021-08-23 RX ORDER — MORPHINE SULFATE 2 MG/ML
2 INJECTION, SOLUTION INTRAMUSCULAR; INTRAVENOUS EVERY 5 MIN PRN
Status: DISCONTINUED | OUTPATIENT
Start: 2021-08-23 | End: 2021-08-23 | Stop reason: HOSPADM

## 2021-08-23 RX ORDER — GLYCOPYRROLATE 1 MG/5 ML
SYRINGE (ML) INTRAVENOUS PRN
Status: DISCONTINUED | OUTPATIENT
Start: 2021-08-23 | End: 2021-08-23 | Stop reason: SDUPTHER

## 2021-08-23 RX ORDER — LIDOCAINE HYDROCHLORIDE 10 MG/ML
INJECTION, SOLUTION EPIDURAL; INFILTRATION; INTRACAUDAL; PERINEURAL PRN
Status: DISCONTINUED | OUTPATIENT
Start: 2021-08-23 | End: 2021-08-23 | Stop reason: SDUPTHER

## 2021-08-23 RX ORDER — MEPERIDINE HYDROCHLORIDE 25 MG/ML
12.5 INJECTION INTRAMUSCULAR; INTRAVENOUS; SUBCUTANEOUS EVERY 5 MIN PRN
Status: DISCONTINUED | OUTPATIENT
Start: 2021-08-23 | End: 2021-08-23 | Stop reason: HOSPADM

## 2021-08-23 RX ORDER — SODIUM CHLORIDE, SODIUM LACTATE, POTASSIUM CHLORIDE, CALCIUM CHLORIDE 600; 310; 30; 20 MG/100ML; MG/100ML; MG/100ML; MG/100ML
INJECTION, SOLUTION INTRAVENOUS CONTINUOUS
Status: DISCONTINUED | OUTPATIENT
Start: 2021-08-23 | End: 2021-08-23 | Stop reason: HOSPADM

## 2021-08-23 RX ORDER — ONDANSETRON 2 MG/ML
INJECTION INTRAMUSCULAR; INTRAVENOUS PRN
Status: DISCONTINUED | OUTPATIENT
Start: 2021-08-23 | End: 2021-08-23 | Stop reason: SDUPTHER

## 2021-08-23 RX ORDER — LIDOCAINE HYDROCHLORIDE 10 MG/ML
1 INJECTION, SOLUTION EPIDURAL; INFILTRATION; INTRACAUDAL; PERINEURAL
Status: DISCONTINUED | OUTPATIENT
Start: 2021-08-23 | End: 2021-08-23 | Stop reason: HOSPADM

## 2021-08-23 RX ADMIN — SODIUM CHLORIDE, POTASSIUM CHLORIDE, SODIUM LACTATE AND CALCIUM CHLORIDE: 600; 310; 30; 20 INJECTION, SOLUTION INTRAVENOUS at 07:24

## 2021-08-23 RX ADMIN — ONDANSETRON 4 MG: 2 INJECTION, SOLUTION INTRAMUSCULAR; INTRAVENOUS at 08:08

## 2021-08-23 RX ADMIN — LIDOCAINE HYDROCHLORIDE 50 MG: 10 INJECTION, SOLUTION EPIDURAL; INFILTRATION; INTRACAUDAL; PERINEURAL at 08:03

## 2021-08-23 RX ADMIN — PROPOFOL 170 MG: 10 INJECTION, EMULSION INTRAVENOUS at 08:03

## 2021-08-23 RX ADMIN — Medication 0.1 MG: at 08:33

## 2021-08-23 RX ADMIN — Medication 0.2 MG: at 08:29

## 2021-08-23 RX ADMIN — FENTANYL CITRATE 50 MCG: 50 INJECTION, SOLUTION INTRAMUSCULAR; INTRAVENOUS at 08:07

## 2021-08-23 ASSESSMENT — PULMONARY FUNCTION TESTS
PIF_VALUE: 2
PIF_VALUE: 2
PIF_VALUE: 16
PIF_VALUE: 16
PIF_VALUE: 13
PIF_VALUE: 16
PIF_VALUE: 14
PIF_VALUE: 13
PIF_VALUE: 1
PIF_VALUE: 14
PIF_VALUE: 13
PIF_VALUE: 20
PIF_VALUE: 15
PIF_VALUE: 0
PIF_VALUE: 15
PIF_VALUE: 17
PIF_VALUE: 14
PIF_VALUE: 19
PIF_VALUE: 6
PIF_VALUE: 12
PIF_VALUE: 1
PIF_VALUE: 1
PIF_VALUE: 15
PIF_VALUE: 17
PIF_VALUE: 22
PIF_VALUE: 14
PIF_VALUE: -11
PIF_VALUE: 16
PIF_VALUE: 20
PIF_VALUE: 1
PIF_VALUE: 14
PIF_VALUE: 18
PIF_VALUE: 14
PIF_VALUE: 3
PIF_VALUE: 7
PIF_VALUE: 2
PIF_VALUE: 17
PIF_VALUE: 16
PIF_VALUE: 14
PIF_VALUE: 15
PIF_VALUE: -12
PIF_VALUE: -12
PIF_VALUE: 14
PIF_VALUE: 18
PIF_VALUE: 16
PIF_VALUE: 18

## 2021-08-23 ASSESSMENT — ENCOUNTER SYMPTOMS
STRIDOR: 0
SHORTNESS OF BREATH: 0
SHORTNESS OF BREATH: 0
COUGH: 0
SORE THROAT: 0
WHEEZING: 0

## 2021-08-23 ASSESSMENT — PAIN - FUNCTIONAL ASSESSMENT: PAIN_FUNCTIONAL_ASSESSMENT: 0-10

## 2021-08-23 ASSESSMENT — PAIN SCALES - WONG BAKER: WONGBAKER_NUMERICALRESPONSE: 0

## 2021-08-23 ASSESSMENT — LIFESTYLE VARIABLES: SMOKING_STATUS: 0

## 2021-08-23 NOTE — ANESTHESIA PRE PROCEDURE
Department of Anesthesiology  Preprocedure Note       Name:  Berenice Enriquez   Age:  73 y.o.  :  1955                                          MRN:  [id number]         Date:  2021      Surgeon: Juanpablo Acuña):  Kuldeep Mims MD    Procedure: Procedure(s):  COLORECTAL CANCER SCREENING, NOT HIGH RISK    Medications prior to admission:   Prior to Admission medications    Medication Sig Start Date End Date Taking? Authorizing Provider   methotrexate (RHEUMATREX) 2.5 MG chemo tablet Take 15 mg by mouth once a week     Historical Provider, MD   folic acid (FOLVITE) 1 MG tablet Take 1 mg by mouth daily 6 days a week, not on . Historical Provider, MD   vitamin D (ERGOCALCIFEROL) 1.25 MG (44962 UT) CAPS capsule Take 50,000 Units by mouth once a week    Historical Provider, MD   betamethasone dipropionate 0.05 % cream Apply topically 2 times daily Apply topically 2 times daily. psoriasis    Historical Provider, MD   Ciclopirox (LOPROX) 0.77 % gel Apply topically 2 times daily psoriasis    Historical Provider, MD   sildenafil (VIAGRA) 100 MG tablet Take 80 mg by mouth as needed for Erectile Dysfunction ed    Historical Provider, MD   pantoprazole (PROTONIX) 20 MG tablet TAKE ONE TABLET BY MOUTH DAILY 3/23/21   MICHAEL Kelly NP   zolpidem (AMBIEN) 5 MG tablet Take 5 mg by mouth At bedtime as needed.     Historical Provider, MD   Multiple Vitamins-Minerals (THERAPEUTIC MULTIVITAMIN-MINERALS) tablet Take 1 tablet by mouth daily    Historical Provider, MD   tadalafil (CIALIS) 5 MG tablet Take 5 mg by mouth as needed for Erectile Dysfunction    Historical Provider, MD       Current medications:    Current Facility-Administered Medications   Medication Dose Route Frequency Provider Last Rate Last Admin    lactated ringers infusion   Intravenous Continuous Rory Hughes MD        lidocaine PF 1 % injection 1 mL  1 mL Intradermal Once PRN Tamar Atkinson MD           Allergies:  No Known Allergies    Problem List:  There is no problem list on file for this patient. Past Medical History:        Diagnosis Date    Diabetes mellitus ([address]     prediabetes    ED (erectile dysfunction)     GERD (gastroesophageal reflux disease)     History of BPH     Mitral valve prolapse     Psoriasis        Past Surgical History:        Procedure Laterality Date    CHOLECYSTECTOMY      HERNIA REPAIR      x 3    KNEE ARTHROSCOPY Right 2017    UPPER GASTROINTESTINAL ENDOSCOPY N/A 6/26/2020    EGD DILATION & BIOPSY performed by Flora Orellana MD at [address] [place] [address]       Social History:    Social History     Tobacco Use    Smoking status: Never Smoker    Smokeless tobacco: Never Used   Substance Use Topics    Alcohol use: Never                                Counseling given: Not Answered      Vital Signs (Current): There were no vitals filed for this visit.                                            BP Readings from Last 3 Encounters:   06/26/20 (!) 160/78   06/26/20 (!) 177/76   06/16/20 (!) 169/96       NPO Status:                                                                                 BMI:   Wt Readings from Last 3 Encounters:   08/09/21 195 lb (88.5 kg)   07/14/20 206 lb 1.6 oz (93.5 kg)   06/26/20 201 lb (91.2 kg)     There is no height or weight on file to calculate BMI.    CBC:   Lab Results   Component Value Date    WBC 6.0 06/17/2020    RBC 5.36 06/17/2020    HGB 16.1 06/17/2020    HCT 47.7 06/17/2020    MCV 89.1 06/17/2020    RDW 13.4 06/17/2020     06/17/2020     LR    CMP:   Lab Results   Component Value Date     06/17/2020    K 4.1 06/17/2020     06/17/2020    CO2 22 06/17/2020    BUN 22 06/17/2020    CREATININE 0.79 06/17/2020    GFRAA >60 06/17/2020    LABGLOM >60 06/17/2020    GLUCOSE 139 06/17/2020    PROT 6.5 06/17/2020    CALCIUM 9.2 06/17/2020    BILITOT 0.34 06/17/2020    ALKPHOS 55 06/17/2020    AST 16 06/17/2020    ALT 18 06/17/2020       POC Tests: No results for input(s): POCGLU, POCNA, POCK, POCCL, POCBUN, POCHEMO, POCHCT in the last 72 hours. Coags: No results found for: PROTIME, INR, APTT    HCG (If Applicable): No results found for: PREGTESTUR, PREGSERUM, HCG, HCGQUANT     ABGs: No results found for: PHART, PO2ART, VZB6OLZ, UWK2PLN, BEART, C1VWFJHW     Type & Screen (If Applicable):  No results found for: LABABO, LABRH    Drug/Infectious Status (If Applicable):  No results found for: HIV, HEPCAB    COVID-19 Screening (If Applicable):   Lab Results   Component Value Date    COVID19 Not Detected 08/19/2021    COVID19 Not Detected 06/22/2020           Anesthesia Evaluation  Patient summary reviewed and Nursing notes reviewed no history of anesthetic complications:   Airway: Mallampati: II  TM distance: >3 FB   Neck ROM: full  Mouth opening: > = 3 FB Dental: normal exam         Pulmonary:Negative Pulmonary ROS and normal exam  breath sounds clear to auscultation      (-) pneumonia, COPD, asthma, shortness of breath, recent URI, sleep apnea, rhonchi, wheezes, rales, stridor, not a current smoker and no decreased breath sounds          Patient did not smoke on day of surgery.                  Cardiovascular:Negative CV ROS  Exercise tolerance: good (>4 METS),       (-) pacemaker, hypertension, valvular problems/murmurs, past MI, CAD, CABG/stent, dysrhythmias,  angina,  CHF, orthopnea, PND,  BULL, murmur, weak pulses,  friction rub, systolic click, carotid bruit,  JVD, peripheral edema, no pulmonary hypertension and no hyperlipidemia    ECG reviewed  Rhythm: regular  Rate: normal           Beta Blocker:  Not on Beta Blocker         Neuro/Psych:   Negative Neuro/Psych ROS     (-) seizures, neuromuscular disease, TIA, CVA, headaches, psychiatric history and depression/anxiety            GI/Hepatic/Renal:   (+) GERD:,      (-) hiatal hernia, PUD, hepatitis, liver disease, no renal disease, bowel prep and no morbid obesity       Endo/Other:    (+) DiabetesType II DM, , no malignancy/cancer. (-) hypothyroidism, hyperthyroidism, blood dyscrasia, arthritis, no electrolyte abnormalities, no malignancy/cancer               Abdominal:             Vascular: negative vascular ROS. - PVD, DVT and PE. Other Findings:           Anesthesia Plan      general     ASA 2       Induction: intravenous. MIPS: Postoperative opioids intended and Prophylactic antiemetics administered. Anesthetic plan and risks discussed with patient. Plan discussed with CRNA.                   Lindsey Sandoval MD   8/23/2021

## 2021-08-23 NOTE — OP NOTE
Operative Note      Patient: Avril Shabazz  YOB: 1955  MRN: [id number]    Date of Procedure: 8/23/2021    PROCEDURE NOTE    DATE OF PROCEDURE: 8/23/2021    SURGEON: Vitaliy Myers MD    ASSISTANT: None    PREOPERATIVE DIAGNOSIS: Screening colonoscopy    POSTOPERATIVE DIAGNOSIS: Grossly normal colonoscopy    OPERATION: Total colonoscopy to cecum with intubation of terminal ileum    ANESTHESIA: General    ESTIMATED BLOOD LOSS: None    COMPLICATIONS: None     SPECIMENS:  Was Not Obtained    HISTORY: The patient is a 73y.o. year old male with history of above preop diagnosis. I recommended colonoscopy with possible biopsy or polypectomy and I explained the risk, benefits, expected outcome, and alternatives to the procedure. Risks included but are not limited to bleeding, infection, respiratory distress, hypotension, and perforation of the colon and possibility of missing a lesion. The patient understands and is in agreement. PROCEDURE: The patient was given IV conscious sedation. The patient's SPO2 remained above 90% throughout the procedure. Digital rectal exam was normal.  The colonoscope was inserted through the anus into the rectum and advanced under direct vision to the cecum without difficulty. Terminal ileum was examined for approximately 2 inches. The prep was fair. Findings:  Terminal ileum: normal    Cecum/Ascending colon: normal    Transverse colon: normal    Descending/Sigmoid colon: normal    Rectum/Anus: examined in normal and retroflexed positions and was normal    Withdrawal Time was (minutes): 18      Next screening colonoscopy: 5 years. If screening is less than 10 years the recommended reason is due:Fair preparation    The colon was decompressed. While withdrawing the scope the above findings were verified and the scope was removed. The patient tolerated the procedure and conscious sedation without unusual events.     In the recovery room patient was examined and

## 2021-08-23 NOTE — ANESTHESIA POSTPROCEDURE EVALUATION
POST- ANESTHESIA EVALUATION       Pt Name: Lisa Hartman  MRN: [id number]  YOB: 1955  Date of evaluation: 8/23/2021  Time:  11:41 AM      BP (!) 145/79   Pulse 66   Temp 96.7 °F (35.9 °C)   Resp 16   Ht 5' 10\" (1.778 m)   Wt 190 lb (86.2 kg)   SpO2 97%   BMI 27.26 kg/m²      Consciousness Level  Awake  Cardiopulmonary Status  Stable  Pain Adequately Treated YES  Nausea / Vomiting  NO  Adequate Hydration  YES  Anesthesia Related Complications NONE      Electronically signed by Alfredo Dang MD on 8/23/2021 at 11:41 AM       Department of Anesthesiology  Postprocedure Note    Patient: Lisa Hartman  MRN: [id number]  YOB: 1955  Date of evaluation: 8/23/2021  Time:  11:41 AM     Procedure Summary     Date: 08/23/21 Room / Location: [address] [place] [address] 04 / [address] [facility] ENDO    Anesthesia Start: 5371 Anesthesia Stop: 1785    Procedure: COLORECTAL CANCER SCREENING, NOT HIGH RISK (N/A Anus) Diagnosis: (COLON CANCER SCREENING (COVID TEST 8/19))    Surgeons: Annia Parks MD Responsible Provider: Alfredo Dang MD    Anesthesia Type: general ASA Status: 2          Anesthesia Type: general    Sandy Phase I: Sandy Score: 9    Sandy Phase II: Sandy Score: 10    Last vitals: Reviewed and per EMR flowsheets.        Anesthesia Post Evaluation

## 2021-08-23 NOTE — H&P
HISTORY and Susi Mukherjee 5747       NAME:  Jeanie Roa  MRN: [id number]   YOB: 1955   Date: 8/23/2021   Age: 73 y.o. Gender: male       COMPLAINT AND PRESENT HISTORY:     Jeanie Roa is 73 y. o.,male, having a colonoscopy. Pt has had multiple previous colonoscopies with last being about 13 years ago at an outside facility in [facility] [place] [facility]. Pt cannot recall if he has history of colon polyps. History of GERD. Takes protonix with good relief. Pt reports occasional melena. Denies abdominal pain, heartburn, nausea, vomiting, diarrhea, constipation, hematochezia, decreased appetite and unintentional weight loss. Denies Family Hx of colon cancer. Completed and followed prescribed prep. NPO. No medications taken this am. Stopped vitamins 5 days ago. Denies taking any other blood thinning medications. Denies current chest pain/pressure, palpitations, SOB, recent URI, fever or chills.      PAST MEDICAL HISTORY     Past Medical History:   Diagnosis Date    Diabetes mellitus ([address]     prediabetes    ED (erectile dysfunction)     GERD (gastroesophageal reflux disease)     History of BPH     Mitral valve prolapse     Psoriasis        SURGICAL HISTORY       Past Surgical History:   Procedure Laterality Date    CHOLECYSTECTOMY      HERNIA REPAIR      x 3    KNEE ARTHROSCOPY Right 2017    UPPER GASTROINTESTINAL ENDOSCOPY N/A 6/26/2020    EGD DILATION & BIOPSY performed by Roxane Mackay MD at [address] [place] [address]       Family History   Problem Relation Age of Onset    Breast Cancer Mother     Diabetes Mother     Coronary Art Dis Father     Lung Cancer Father     Prostate Cancer Father        SOCIAL HISTORY       Social History     Socioeconomic History    Marital status: Single     Spouse name: Not on file    Number of children: Not on file    Years of education: Not on file    Highest education level: Not on file   Occupational History    Not on file   Tobacco Use    Smoking status: Never Smoker    Smokeless tobacco: Never Used   Vaping Use    Vaping Use: Never used   Substance and Sexual Activity    Alcohol use: Never    Drug use: Never    Sexual activity: Not on file   Other Topics Concern    Not on file   Social History Narrative    Not on file     Social Determinants of Health     Financial Resource Strain:     Difficulty of Paying Living Expenses:    Food Insecurity:     Worried About Running Out of Food in the Last Year:     [address] in the Last Year:    Transportation Needs:     Lack of Transportation (Medical):  Lack of Transportation (Non-Medical):    Physical Activity:     Days of Exercise per Week:     Minutes of Exercise per Session:    Stress:     Feeling of Stress :    Social Connections:     Frequency of Communication with Friends and Family:     Frequency of Social Gatherings with Friends and Family:     Attends [employer] Services:     Active Member of Clubs or Organizations:     Attends Club or Organization Meetings:     Marital Status:    Intimate Partner Violence:     Fear of Current or Ex-Partner:     Emotionally Abused:     Physically Abused:     Sexually Abused:         REVIEW OF SYSTEMS      No Known Allergies    No current facility-administered medications on file prior to encounter. Current Outpatient Medications on File Prior to Encounter   Medication Sig Dispense Refill    pantoprazole (PROTONIX) 20 MG tablet TAKE ONE TABLET BY MOUTH DAILY 90 tablet 2    zolpidem (AMBIEN) 5 MG tablet Take 5 mg by mouth At bedtime as needed.  Multiple Vitamins-Minerals (THERAPEUTIC MULTIVITAMIN-MINERALS) tablet Take 1 tablet by mouth daily      tadalafil (CIALIS) 5 MG tablet Take 5 mg by mouth as needed for Erectile Dysfunction     Notation: Above medications are not currently reconciled at time of signing this H&P note, to be reconciled in pre-op per RN.        Review of Systems Constitutional: Negative for appetite change, chills, fever and unexpected weight change. HENT: Negative for congestion and sore throat. Eyes: Positive for visual disturbance (Glasses). Respiratory: Negative for cough, shortness of breath and wheezing. Cardiovascular: Negative for chest pain, palpitations and leg swelling. Gastrointestinal:        See HPI   Genitourinary: Negative. Musculoskeletal: Negative. Skin: Negative. Neurological: Positive for light-headedness (Occasional). Negative for dizziness and headaches. Hematological: Negative. Psychiatric/Behavioral: Negative. GENERAL PHYSICAL EXAM     Vitals: Review vitals per RN flowsheet. Physical Exam  Constitutional:       General: He is not in acute distress. Appearance: He is not ill-appearing, toxic-appearing or diaphoretic. HENT:      Head: Normocephalic and atraumatic. Nose: Nose normal. No congestion. Mouth/Throat:      Mouth: Mucous membranes are moist.      Pharynx: Oropharynx is clear. No oropharyngeal exudate or posterior oropharyngeal erythema. Eyes:      General: No scleral icterus. Conjunctiva/sclera: Conjunctivae normal.   Cardiovascular:      Rate and Rhythm: Normal rate and regular rhythm. Heart sounds: Normal heart sounds. No murmur heard. No friction rub. No gallop. Pulmonary:      Effort: Pulmonary effort is normal. No respiratory distress. Breath sounds: Normal breath sounds. No wheezing, rhonchi or rales. Abdominal:      General: Bowel sounds are normal. There is no distension. Palpations: Abdomen is soft. Tenderness: There is no abdominal tenderness. There is no guarding. Musculoskeletal:         General: No swelling or tenderness. Cervical back: Neck supple. No tenderness. Right lower leg: No edema. Left lower leg: No edema. Skin:     General: Skin is warm and dry. Coloration: Skin is not jaundiced. Neurological:      General: No focal deficit present. Mental Status: He is alert and oriented to person, place, and time. Gait: Gait normal.   Psychiatric:         Mood and Affect: Mood normal.        PROVISIONAL DIAGNOSES / SURGERY:      COLON CANCER SCREENING     COLORECTAL CANCER SCREENING, NOT HIGH RISK    There are no problems to display for this patient.           MICHAEL Candelario - CNP on 8/23/2021 at 6:20 AM

## 2021-11-25 ENCOUNTER — APPOINTMENT (OUTPATIENT)
Dept: CT IMAGING | Age: 66
DRG: 305 | End: 2021-11-25
Payer: MEDICARE

## 2021-11-25 ENCOUNTER — APPOINTMENT (OUTPATIENT)
Dept: GENERAL RADIOLOGY | Age: 66
DRG: 305 | End: 2021-11-25
Payer: MEDICARE

## 2021-11-25 ENCOUNTER — HOSPITAL ENCOUNTER (INPATIENT)
Age: 66
LOS: 2 days | Discharge: HOME | DRG: 305 | End: 2021-11-27
Attending: EMERGENCY MEDICINE | Admitting: FAMILY MEDICINE
Payer: MEDICARE

## 2021-11-25 PROBLEM — I16.0 HYPERTENSIVE URGENCY: Status: ACTIVE | Noted: 2021-11-25

## 2021-11-25 LAB
ABSOLUTE EOS #: 0.2 K/UL (ref 0–0.4)
ABSOLUTE IMMATURE GRANULOCYTE: ABNORMAL K/UL (ref 0–0.3)
ABSOLUTE LYMPH #: 1.1 K/UL (ref 1–4.8)
ABSOLUTE MONO #: 0.5 K/UL (ref 0.1–1.3)
ANION GAP SERPL CALCULATED.3IONS-SCNC: 11 MMOL/L (ref 9–17)
BASOPHILS # BLD: 1 % (ref 0–2)
BASOPHILS ABSOLUTE: 0.1 K/UL (ref 0–0.2)
BNP INTERPRETATION: NORMAL
BUN BLDV-MCNC: 13 MG/DL (ref 8–23)
BUN/CREAT BLD: ABNORMAL (ref 9–20)
CALCIUM SERPL-MCNC: 9.1 MG/DL (ref 8.6–10.4)
CHLORIDE BLD-SCNC: 103 MMOL/L (ref 98–107)
CO2: 24 MMOL/L (ref 20–31)
CREAT SERPL-MCNC: 0.74 MG/DL (ref 0.7–1.2)
DIFFERENTIAL TYPE: ABNORMAL
EOSINOPHILS RELATIVE PERCENT: 3 % (ref 0–4)
GFR AFRICAN AMERICAN: >60 ML/MIN
GFR NON-AFRICAN AMERICAN: >60 ML/MIN
GFR SERPL CREATININE-BSD FRML MDRD: ABNORMAL ML/MIN/{1.73_M2}
GFR SERPL CREATININE-BSD FRML MDRD: ABNORMAL ML/MIN/{1.73_M2}
GLUCOSE BLD-MCNC: 156 MG/DL (ref 70–99)
HCT VFR BLD CALC: 44.9 % (ref 41–53)
HEMOGLOBIN: 15.5 G/DL (ref 13.5–17.5)
IMMATURE GRANULOCYTES: ABNORMAL %
LYMPHOCYTES # BLD: 21 % (ref 24–44)
MCH RBC QN AUTO: 31 PG (ref 26–34)
MCHC RBC AUTO-ENTMCNC: 34.6 G/DL (ref 31–37)
MCV RBC AUTO: 89.7 FL (ref 80–100)
MONOCYTES # BLD: 9 % (ref 1–7)
NRBC AUTOMATED: ABNORMAL PER 100 WBC
PDW BLD-RTO: 13.9 % (ref 11.5–14.9)
PLATELET # BLD: 177 K/UL (ref 150–450)
PLATELET ESTIMATE: ABNORMAL
PMV BLD AUTO: 8.1 FL (ref 6–12)
POTASSIUM SERPL-SCNC: 3.7 MMOL/L (ref 3.7–5.3)
PRO-BNP: 42 PG/ML
RBC # BLD: 5 M/UL (ref 4.5–5.9)
RBC # BLD: ABNORMAL 10*6/UL
SEG NEUTROPHILS: 66 % (ref 36–66)
SEGMENTED NEUTROPHILS ABSOLUTE COUNT: 3.5 K/UL (ref 1.3–9.1)
SODIUM BLD-SCNC: 138 MMOL/L (ref 135–144)
TROPONIN INTERP: NORMAL
TROPONIN T: NORMAL NG/ML
TROPONIN, HIGH SENSITIVITY: <6 NG/L (ref 0–22)
WBC # BLD: 5.3 K/UL (ref 3.5–11)
WBC # BLD: ABNORMAL 10*3/UL

## 2021-11-25 PROCEDURE — 85025 COMPLETE CBC W/AUTO DIFF WBC: CPT

## 2021-11-25 PROCEDURE — 70496 CT ANGIOGRAPHY HEAD: CPT

## 2021-11-25 PROCEDURE — 71045 X-RAY EXAM CHEST 1 VIEW: CPT

## 2021-11-25 PROCEDURE — 6360000004 HC RX CONTRAST MEDICATION: Performed by: EMERGENCY MEDICINE

## 2021-11-25 PROCEDURE — 80048 BASIC METABOLIC PNL TOTAL CA: CPT

## 2021-11-25 PROCEDURE — 84484 ASSAY OF TROPONIN QUANT: CPT

## 2021-11-25 PROCEDURE — 99448 NTRPROF PH1/NTRNET/EHR 21-30: CPT | Performed by: PSYCHIATRY & NEUROLOGY

## 2021-11-25 PROCEDURE — 70450 CT HEAD/BRAIN W/O DYE: CPT

## 2021-11-25 PROCEDURE — 36415 COLL VENOUS BLD VENIPUNCTURE: CPT

## 2021-11-25 PROCEDURE — 83880 ASSAY OF NATRIURETIC PEPTIDE: CPT

## 2021-11-25 PROCEDURE — 6370000000 HC RX 637 (ALT 250 FOR IP): Performed by: EMERGENCY MEDICINE

## 2021-11-25 PROCEDURE — 2060000000 HC ICU INTERMEDIATE R&B

## 2021-11-25 PROCEDURE — 99284 EMERGENCY DEPT VISIT MOD MDM: CPT

## 2021-11-25 PROCEDURE — 2580000003 HC RX 258: Performed by: EMERGENCY MEDICINE

## 2021-11-25 RX ORDER — ONDANSETRON 2 MG/ML
4 INJECTION INTRAMUSCULAR; INTRAVENOUS EVERY 6 HOURS PRN
Status: DISCONTINUED | OUTPATIENT
Start: 2021-11-25 | End: 2021-11-27 | Stop reason: HOSPADM

## 2021-11-25 RX ORDER — SODIUM CHLORIDE 0.9 % (FLUSH) 0.9 %
10 SYRINGE (ML) INJECTION PRN
Status: DISCONTINUED | OUTPATIENT
Start: 2021-11-25 | End: 2021-11-27 | Stop reason: HOSPADM

## 2021-11-25 RX ORDER — DEXAMETHASONE SODIUM PHOSPHATE 10 MG/ML
10 INJECTION INTRAMUSCULAR; INTRAVENOUS ONCE
Status: DISCONTINUED | OUTPATIENT
Start: 2021-11-25 | End: 2021-11-25

## 2021-11-25 RX ORDER — ACETAMINOPHEN 325 MG/1
650 TABLET ORAL EVERY 4 HOURS PRN
Status: DISCONTINUED | OUTPATIENT
Start: 2021-11-25 | End: 2021-11-27 | Stop reason: HOSPADM

## 2021-11-25 RX ORDER — 0.9 % SODIUM CHLORIDE 0.9 %
80 INTRAVENOUS SOLUTION INTRAVENOUS ONCE
Status: COMPLETED | OUTPATIENT
Start: 2021-11-25 | End: 2021-11-25

## 2021-11-25 RX ORDER — LOSARTAN POTASSIUM 100 MG/1
100 TABLET ORAL DAILY
Status: DISCONTINUED | OUTPATIENT
Start: 2021-11-25 | End: 2021-11-27 | Stop reason: HOSPADM

## 2021-11-25 RX ORDER — AMLODIPINE BESYLATE 5 MG/1
10 TABLET ORAL DAILY
Status: DISCONTINUED | OUTPATIENT
Start: 2021-11-25 | End: 2021-11-27 | Stop reason: HOSPADM

## 2021-11-25 RX ORDER — SODIUM CHLORIDE 9 MG/ML
25 INJECTION, SOLUTION INTRAVENOUS PRN
Status: DISCONTINUED | OUTPATIENT
Start: 2021-11-25 | End: 2021-11-27 | Stop reason: HOSPADM

## 2021-11-25 RX ORDER — ONDANSETRON 4 MG/1
4 TABLET, ORALLY DISINTEGRATING ORAL EVERY 8 HOURS PRN
Status: DISCONTINUED | OUTPATIENT
Start: 2021-11-25 | End: 2021-11-27 | Stop reason: HOSPADM

## 2021-11-25 RX ORDER — SODIUM CHLORIDE 0.9 % (FLUSH) 0.9 %
5-40 SYRINGE (ML) INJECTION PRN
Status: DISCONTINUED | OUTPATIENT
Start: 2021-11-25 | End: 2021-11-27 | Stop reason: HOSPADM

## 2021-11-25 RX ORDER — SODIUM CHLORIDE 0.9 % (FLUSH) 0.9 %
5-40 SYRINGE (ML) INJECTION EVERY 12 HOURS SCHEDULED
Status: DISCONTINUED | OUTPATIENT
Start: 2021-11-25 | End: 2021-11-27 | Stop reason: HOSPADM

## 2021-11-25 RX ADMIN — SODIUM CHLORIDE 80 ML: 9 INJECTION, SOLUTION INTRAVENOUS at 18:57

## 2021-11-25 RX ADMIN — IOPAMIDOL 75 ML: 755 INJECTION, SOLUTION INTRAVENOUS at 18:56

## 2021-11-25 RX ADMIN — LOSARTAN POTASSIUM 100 MG: 100 TABLET, FILM COATED ORAL at 22:06

## 2021-11-25 RX ADMIN — SODIUM CHLORIDE, PRESERVATIVE FREE 10 ML: 5 INJECTION INTRAVENOUS at 18:57

## 2021-11-25 RX ADMIN — AMLODIPINE BESYLATE 10 MG: 5 TABLET ORAL at 22:06

## 2021-11-25 ASSESSMENT — ENCOUNTER SYMPTOMS
ABDOMINAL PAIN: 0
NAUSEA: 0
SHORTNESS OF BREATH: 0
CHEST TIGHTNESS: 0
COLOR CHANGE: 0
VOMITING: 0
DIARRHEA: 0
CONSTIPATION: 0

## 2021-11-25 ASSESSMENT — PAIN SCALES - GENERAL: PAINLEVEL_OUTOF10: 0

## 2021-11-25 NOTE — ED PROVIDER NOTES
[facility]  Emergency Department Encounter  EmergencyMedicine Resident     Pt Name:Leon Estes  MRN: [id number]  [place] 1955  Date of evaluation: 11/25/21  PCP:  Raad Pressley       Chief Complaint   Patient presents with    Dizziness     Lightheadedness x 1 month    Hypertension    Nausea       HISTORY OF PRESENT ILLNESS  (Location/Symptom, Timing/Onset, Context/Setting, Quality, Duration, Modifying Factors, Severity.)      Eugenia Burns is a 73 y.o. male who presents with dizziness, head pressure, and hypertension. Patient states about a month ago he was experiencing some dizziness when laying flat working underneath the sink, he is noted progressively throughout times any movement and elevation to laying flat to leaning over can cause some dizziness, patient unable to identify whether it is room spinning or him spinning he describes it is more just a off feeling. States that he also has had some anterior head pressure, does not want to describe it as a headache, has difficulty describing it but states it is more head pressure. Initially this patient checked his blood pressure multiple times today and was noted to be hypertensive. Patient has no history of hypertension. Patient denies any vision changes, chest pain, shortness of breath, abdominal pain, change in urination or bowel habits. Patient was diagnosed with Covid about 2 months ago. PAST MEDICAL / SURGICAL / SOCIAL / FAMILY HISTORY      has a past medical history of Diabetes mellitus ([address] ED (erectile dysfunction), GERD (gastroesophageal reflux disease), History of BPH, Mitral valve prolapse, and Psoriasis. No additional pertinent     has a past surgical history that includes Cholecystectomy; hernia repair; Knee arthroscopy (Right, 2017); Upper gastrointestinal endoscopy (N/A, 6/26/2020); and Colonoscopy (N/A, 8/23/2021).   No additional pertinent    Social History     Socioeconomic History  Marital status: Single     Spouse name: Not on file    Number of children: Not on file    Years of education: Not on file    Highest education level: Not on file   Occupational History    Not on file   Tobacco Use    Smoking status: Never Smoker    Smokeless tobacco: Never Used   Vaping Use    Vaping Use: Never used   Substance and Sexual Activity    Alcohol use: Never    Drug use: Never    Sexual activity: Not on file   Other Topics Concern    Not on file   Social History Narrative    Not on file     Social Determinants of Health     Financial Resource Strain:     Difficulty of Paying Living Expenses: Not on file   Food Insecurity:     Worried About Running Out of Food in the Last Year: Not on file    Sofiya of Food in the Last Year: Not on file   Transportation Needs:     Lack of Transportation (Medical): Not on file    Lack of Transportation (Non-Medical):  Not on file   Physical Activity:     Days of Exercise per Week: Not on file    Minutes of Exercise per Session: Not on file   Stress:     Feeling of Stress : Not on file   Social Connections:     Frequency of Communication with Friends and Family: Not on file    Frequency of Social Gatherings with Friends and Family: Not on file    Attends Jehovah's witness Services: Not on file    Active Member of [address] [place] [address] [employer] or Organizations: Not on file    Attends Club or Organization Meetings: Not on file    Marital Status: Not on file   Intimate Partner Violence:     Fear of Current or Ex-Partner: Not on file    Emotionally Abused: Not on file    Physically Abused: Not on file    Sexually Abused: Not on file   Housing Stability:     Unable to Pay for Housing in the Last Year: Not on file    Number of [place] in the Last Year: Not on file    Unstable Housing in the Last Year: Not on file       Family History   Problem Relation Age of Onset    Breast Cancer Mother     Diabetes Mother     Coronary Art Dis Father     Lung Cancer Father    Shimon Alfonso Prostate Cancer Father        Allergies:  Patient has no known allergies. Home Medications:  Prior to Admission medications    Medication Sig Start Date End Date Taking? Authorizing Provider   vitamin D (ERGOCALCIFEROL) 1.25 MG (62829 UT) CAPS capsule Take 50,000 Units by mouth once a week    Historical Provider, MD   pantoprazole (PROTONIX) 20 MG tablet TAKE ONE TABLET BY MOUTH DAILY 3/23/21   Cheli Posadas APRN - NP   zolpidem (AMBIEN) 5 MG tablet Take 5 mg by mouth At bedtime as needed. Historical Provider, MD   Multiple Vitamins-Minerals (THERAPEUTIC MULTIVITAMIN-MINERALS) tablet Take 1 tablet by mouth daily    Historical Provider, MD   tadalafil (CIALIS) 5 MG tablet Take 5 mg by mouth as needed for Erectile Dysfunction    Historical Provider, MD       REVIEW OF SYSTEMS    (2-9 systems for level 4, 10 or more for level 5)      Review of Systems   Constitutional: Negative for chills and fever. HENT: Negative for congestion. Eyes: Negative for visual disturbance. Respiratory: Negative for chest tightness and shortness of breath. Cardiovascular: Negative for chest pain and leg swelling. Gastrointestinal: Negative for abdominal pain, constipation, diarrhea, nausea and vomiting. Endocrine: Negative for polyuria. Genitourinary: Negative for difficulty urinating. Musculoskeletal: Negative for gait problem and neck pain. Skin: Negative for color change. Neurological: Positive for dizziness and headaches. Negative for syncope, weakness, light-headedness and numbness. Psychiatric/Behavioral: Negative for confusion. PHYSICAL EXAM   (up to 7 for level 4, 8 or more for level 5)      INITIAL VITALS:   BP (!) 147/77   Pulse 71   Temp 98.4 °F (36.9 °C) (Oral)   Resp 18   Ht 5' 10\" (1.778 m)   Wt 203 lb 4.2 oz (92.2 kg)   SpO2 96%   BMI 29.17 kg/m²     Physical Exam  Constitutional:       Appearance: Normal appearance. HENT:      Head: Normocephalic and atraumatic.       Right Ear: Tympanic membrane and ear canal normal.      Left Ear: Tympanic membrane and ear canal normal.      Mouth/Throat:      Mouth: Mucous membranes are moist.      Pharynx: Oropharynx is clear. Eyes:      Extraocular Movements: Extraocular movements intact. Conjunctiva/sclera: Conjunctivae normal.   Cardiovascular:      Rate and Rhythm: Normal rate and regular rhythm. Pulses: Normal pulses. Heart sounds: Normal heart sounds. No murmur heard. Pulmonary:      Effort: Pulmonary effort is normal.      Breath sounds: Normal breath sounds. Abdominal:      General: Bowel sounds are normal. There is no distension. Tenderness: There is no abdominal tenderness. There is no guarding. Musculoskeletal:         General: Normal range of motion. Comments: Range of motion noted to be normal with patient's natural movements   Skin:     General: Skin is warm and dry. Findings: No rash (On exposed skin). Neurological:      General: No focal deficit present. Mental Status: He is alert and oriented to person, place, and time. Sensory: No sensory deficit. Motor: No weakness. Coordination: Coordination normal.   Psychiatric:         Mood and Affect: Mood normal.         Behavior: Behavior normal.         DIFFERENTIAL  DIAGNOSIS     PLAN (LABS / IMAGING / EKG):  Orders Placed This Encounter   Procedures    CT Head WO Contrast    CTA HEAD NECK W CONTRAST    XR CHEST PORTABLE    CBC Auto Differential    Basic Metabolic Panel w/ Reflex to MG    Troponin    Brain Natriuretic Peptide    ADULT DIET;  Regular    Telemetry monitoring - 48 hour duration    Vital signs per unit routine    Notify physician    Notify physician    Up with assistance    Place intermittent pneumatic compression device    Full Code    Inpatient consult to Internal Medicine    EKG 12 Lead    Place CT Compatible peripheral IV    PATIENT STATUS (FROM ED OR OR/PROCEDURAL) Inpatient MEDICATIONS ORDERED:  Orders Placed This Encounter   Medications    DISCONTD: dexamethasone (DECADRON) injection 10 mg    0.9 % sodium chloride bolus    iopamidol (ISOVUE-370) 76 % injection 75 mL    sodium chloride flush 0.9 % injection 10 mL    DISCONTD: niCARdipine (CARDENE) 25 mg in sodium chloride 0.9 % 250 mL infusion    losartan (COZAAR) tablet 100 mg    amLODIPine (NORVASC) tablet 10 mg    sodium chloride flush 0.9 % injection 5-40 mL    sodium chloride flush 0.9 % injection 5-40 mL    0.9 % sodium chloride infusion    enoxaparin (LOVENOX) injection 40 mg    acetaminophen (TYLENOL) tablet 650 mg    OR Linked Order Group     ondansetron (ZOFRAN-ODT) disintegrating tablet 4 mg     ondansetron (ZOFRAN) injection 4 mg       DIAGNOSTIC RESULTS / EMERGENCY DEPARTMENT COURSE / MDM   LAB RESULTS:  Results for orders placed or performed during the hospital encounter of 11/25/21   CBC Auto Differential   Result Value Ref Range    WBC 5.3 3.5 - 11.0 k/uL    RBC 5.00 4.5 - 5.9 m/uL    Hemoglobin 15.5 13.5 - 17.5 g/dL    Hematocrit 44.9 41 - 53 %    MCV 89.7 80 - 100 fL    MCH 31.0 26 - 34 pg    MCHC 34.6 31 - 37 g/dL    RDW 13.9 11.5 - 14.9 %    Platelets 362 532 - 802 k/uL    MPV 8.1 6.0 - 12.0 fL    NRBC Automated NOT REPORTED per 100 WBC    Differential Type NOT REPORTED     Immature Granulocytes NOT REPORTED 0 %    Absolute Immature Granulocyte NOT REPORTED 0.00 - 0.30 k/uL    WBC Morphology NOT REPORTED     RBC Morphology NOT REPORTED     Platelet Estimate NOT REPORTED     Seg Neutrophils 66 36 - 66 %    Lymphocytes 21 (L) 24 - 44 %    Monocytes 9 (H) 1 - 7 %    Eosinophils % 3 0 - 4 %    Basophils 1 0 - 2 %    Segs Absolute 3.50 1.3 - 9.1 k/uL    Absolute Lymph # 1.10 1.0 - 4.8 k/uL    Absolute Mono # 0.50 0.1 - 1.3 k/uL    Absolute Eos # 0.20 0.0 - 0.4 k/uL    Basophils Absolute 0.10 0.0 - 0.2 k/uL   Basic Metabolic Panel w/ Reflex to MG   Result Value Ref Range    Glucose 156 (H) 70 - 99 mg/dL    BUN 13 8 - 23 mg/dL    CREATININE 0.74 0.70 - 1.20 mg/dL    Bun/Cre Ratio NOT REPORTED 9 - 20    Calcium 9.1 8.6 - 10.4 mg/dL    Sodium 138 135 - 144 mmol/L    Potassium 3.7 3.7 - 5.3 mmol/L    Chloride 103 98 - 107 mmol/L    CO2 24 20 - 31 mmol/L    Anion Gap 11 9 - 17 mmol/L    GFR Non-African American >60 >60 mL/min    GFR African American >60 >60 mL/min    GFR Comment          GFR Staging NOT REPORTED    Troponin   Result Value Ref Range    Troponin, High Sensitivity <6 0 - 22 ng/L    Troponin T NOT REPORTED <0.03 ng/mL    Troponin Interp NOT REPORTED    Brain Natriuretic Peptide   Result Value Ref Range    Pro-BNP 42 <300 pg/mL    BNP Interpretation NOT REPORTED        RADIOLOGY:  CT Head WO Contrast   Final Result   No acute intracranial abnormality. CTA HEAD NECK W CONTRAST   Final Result   No hemodynamically significant stenosis within the head and neck vasculature. 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 XR CHEST PORTABLE   Final Result   No acute cardiopulmonary abnormality. EKG  EKG Interpretation    Interpreted by emergency department physician    Rhythm: normal sinus   Rate: normal  Axis: normal  Ectopy: none  Conduction: normal  ST Segments: normal  T Waves: normal  Q Waves: none    Clinical Impression: non-specific EKG    Chaz Fisher DO     All EKG's are interpreted by the Emergency Department Physician who either signs or Co-signs this chart in the absence of a cardiologist.    EMERGENCY DEPARTMENT COURSE:  ED Course as of 11/26/21 0118   Thu Nov 25, 2021 1951 Patient noted to have small aneurysm 2.5 on carotid.   Will contact neuro endovascular for further recommendations and plan. [CR]      ED Course User Index  [CR] Lila Ware DO          PROCEDURES:  None    CONSULTS:  IP CONSULT TO INTERNAL MEDICINE    MEDICAL DECISION MAKING:  Patient presenting with dizziness, headache described as frontal pressure, and hypertension. Patient has no history of hypertension. Work-up for dizziness demonstrated no cardiac cause, patient has CT head which demonstrated 2.5 mm aneurysm on the superior aspect carotid, neuro endovascular consulted, have no further interventions at this time will follow up outpatient. They do want blood pressure control for hypertensive urgency. Cardiac work-up noted to be negative, with normal troponins and BMP. Patient had normal EKG. Patient's electrolytes within normal limits. Patient admitted for hypertensive urgency, patient given losartan 100 mg, Norvasc 10 mg for blood pressure control. Initially discussed starting Cardene but admitting team requested to start these medications. Patient evaluated for blood pressure control. Patient will follow up with neuro endovascular on Tuesday for imaging. Patient admitted    CRITICAL CARE:  Please see attending note    FINAL IMPRESSION    Hypertensive urgency    DISPOSITION / PLAN     DISPOSITION Admitted 11/25/2021 09:09:55 PM      PATIENT REFERRED TO:  No follow-up provider specified.     DISCHARGE MEDICATIONS:  Current Discharge Medication List          Chaz Vega DO  Emergency Medicine Resident    (Please note that portions of thisnote were completed with a voice recognition program.  Efforts were made to edit the dictations but occasionally words are mis-transcribed.)       Patel Hernandez DO  Resident  11/26/21 63 Renetta Gentile DO  Resident  11/26/21 0120

## 2021-11-25 NOTE — ED NOTES
PT c/o dizziness onset xs 1 month and worsening with headache across forehead. Pt decided today to check his BP and it was reading high. Pt had his year check up in Sept and had covid in the month and ever since had dizzy spells. Pt has had episodes of nausea intermittently.       Heatehr White RN  11/25/21 0072

## 2021-11-25 NOTE — ED PROVIDER NOTES
[address] [facility] ED     Emergency Department     Faculty Attestation        I performed a history and physical examination of the patient and discussed management with the resident. I reviewed the residents note and agree with the documented findings and plan of care. Any areas of disagreement are noted on the chart. I was personally present for the key portions of any procedures. I have documented in the chart those procedures where I was not present during the key portions. I have reviewed the emergency nurses triage note. I agree with the chief complaint, past medical history, past surgical history, allergies, medications, social and family history as documented unless otherwise noted below. Documentation of the HPI, Physical Exam and Medical Decision Making performed by medical students or scribes is based on my personal performance of the HPI, PE and MDM. For Physician Assistant/ Nurse Practitioner cases/documentation I have have had a face to face evaluation with this patient and have completed at least one if not all key elements of the E/M (history, physical exam, and MDM). Additional findings are as noted. Pertinent Comments     Intermittent dizziness, headache and high blood pressure that he just noticed today. Not currently on any blood pressure medications. No chest pain. Incidentally had Covid about a month ago and recovered fine. No history of stroke. Has been off balance somewhat however no gross neurologic deficits on my exam.    We will get head CT, CTA head and neck, cardiac work-up, electrolytes. With the onset of his symptoms several days to weeks ago he is not in the window for TPA and not a candidate for mechanical thrombectomy.     EKG Interpretation    Interpreted by me    Rhythm: normal sinus   Rate: normal  Axis: normal  Ectopy: none  Conduction: normal  ST Segments: no acute change  T Waves: no acute change  Q Waves: none    Clinical Impression: Nonspecific EKG    The care is provided during an unprecedented national emergency due to the novel coronavirus, COVID-19.     (Please note that portions of this note were completed with a voice recognition program.  Efforts were made to edit the dictations but occasionally words are mis-transcribed.)    Rae Vargas,   Attending Emergency Physician         Rae Vragas, DO  11/25/21 Hi 21, DO  11/25/21 6121

## 2021-11-26 LAB
EKG ATRIAL RATE: 69 BPM
EKG P AXIS: 58 DEGREES
EKG P-R INTERVAL: 158 MS
EKG Q-T INTERVAL: 414 MS
EKG QRS DURATION: 84 MS
EKG QTC CALCULATION (BAZETT): 443 MS
EKG R AXIS: 55 DEGREES
EKG T AXIS: 74 DEGREES
EKG VENTRICULAR RATE: 69 BPM

## 2021-11-26 PROCEDURE — 6360000002 HC RX W HCPCS: Performed by: EMERGENCY MEDICINE

## 2021-11-26 PROCEDURE — 2580000003 HC RX 258: Performed by: EMERGENCY MEDICINE

## 2021-11-26 PROCEDURE — 6370000000 HC RX 637 (ALT 250 FOR IP): Performed by: EMERGENCY MEDICINE

## 2021-11-26 PROCEDURE — 6370000000 HC RX 637 (ALT 250 FOR IP): Performed by: FAMILY MEDICINE

## 2021-11-26 PROCEDURE — 2060000000 HC ICU INTERMEDIATE R&B

## 2021-11-26 RX ORDER — HYDROCHLOROTHIAZIDE 25 MG/1
12.5 TABLET ORAL DAILY
Status: DISCONTINUED | OUTPATIENT
Start: 2021-11-26 | End: 2021-11-27 | Stop reason: HOSPADM

## 2021-11-26 RX ORDER — ZOLPIDEM TARTRATE 5 MG/1
5 TABLET ORAL NIGHTLY PRN
Status: DISCONTINUED | OUTPATIENT
Start: 2021-11-26 | End: 2021-11-27 | Stop reason: HOSPADM

## 2021-11-26 RX ORDER — TADALAFIL 5 MG/1
5 TABLET ORAL PRN
Status: DISCONTINUED | OUTPATIENT
Start: 2021-11-26 | End: 2021-11-26

## 2021-11-26 RX ORDER — PANTOPRAZOLE SODIUM 20 MG/1
20 TABLET, DELAYED RELEASE ORAL DAILY
Status: DISCONTINUED | OUTPATIENT
Start: 2021-11-26 | End: 2021-11-27 | Stop reason: HOSPADM

## 2021-11-26 RX ORDER — HYDRALAZINE HYDROCHLORIDE 25 MG/1
25 TABLET, FILM COATED ORAL EVERY 8 HOURS SCHEDULED
Status: DISCONTINUED | OUTPATIENT
Start: 2021-11-26 | End: 2021-11-27 | Stop reason: HOSPADM

## 2021-11-26 RX ADMIN — ENOXAPARIN SODIUM 40 MG: 100 INJECTION SUBCUTANEOUS at 08:45

## 2021-11-26 RX ADMIN — LOSARTAN POTASSIUM 100 MG: 100 TABLET, FILM COATED ORAL at 11:42

## 2021-11-26 RX ADMIN — SODIUM CHLORIDE, PRESERVATIVE FREE 10 ML: 5 INJECTION INTRAVENOUS at 08:37

## 2021-11-26 RX ADMIN — HYDROCHLOROTHIAZIDE 12.5 MG: 25 TABLET ORAL at 17:43

## 2021-11-26 RX ADMIN — SODIUM CHLORIDE, PRESERVATIVE FREE 10 ML: 5 INJECTION INTRAVENOUS at 02:22

## 2021-11-26 RX ADMIN — AMLODIPINE BESYLATE 10 MG: 5 TABLET ORAL at 08:45

## 2021-11-26 RX ADMIN — ZOLPIDEM TARTRATE 5 MG: 5 TABLET, COATED ORAL at 22:25

## 2021-11-26 RX ADMIN — HYDRALAZINE HYDROCHLORIDE 25 MG: 25 TABLET, FILM COATED ORAL at 22:25

## 2021-11-26 RX ADMIN — SODIUM CHLORIDE, PRESERVATIVE FREE 10 ML: 5 INJECTION INTRAVENOUS at 22:26

## 2021-11-26 NOTE — ED NOTES
Patient transferred to room 2087 via wheelchair. Hand off given to Nicole Lyons RN.       Mariaelena Flores  11/25/21 8940

## 2021-11-26 NOTE — PROGRESS NOTES
Pt admitted to room 2087- Dx: Hypertensive crisis. Hx- DM, BPH, GERD. Pt alert and oriented. Pt denies pain at this time. Pt oriented to room/call light/procedures/orders. Call light and tray table in reach.

## 2021-11-26 NOTE — CARE COORDINATION
CASE MANAGEMENT NOTE:    Admission Date:  11/25/2021 Malou Ordonez is a 73 y.o.  male    Admitted for : Hypertensive urgency [I16.0]    Met with:  Patient    PCP:  Irineo Ta                                Insurance:  Medicare      Is patient alert and oriented at time of discussion:  Yes    Current Residence/ Living Arrangements:  independently at home             Current Services PTA:  No    Does patient go to outpatient dialysis: No  If yes, location and chair time: NA    Is patient agreeable to VNS: No    Freedom of choice provided:  No    List of [address] provided: No    VNS chosen:  No    DME:  none    Home Oxygen: No    Nebulizer: No    CPAP/BIPAP: No    Supplier: N/A    Potential Assistance Needed: No    SNF needed: No    Freedom of choice and list provided: NA    Pharmacy:  [facility]       Does Patient want to use MEDS to BEDS? No    Is patient currently receiving oral anticoagulation therapy? No    Is the Patient an [facility] with Readmission Risk Score greater than 14%? No  If yes, pt needs a follow up appointment made within 7 days. Family Members/Caregivers that pt would like involved in their care:Yes       If yes, list name here:  Carlton Hari    Transportation Provider:  Patient             Discharge Plan:  11/26 MEDICARE From home alone independent. DME: none Denies needs. Denies VNS. BP elevated on admit  180/96 started on norvasc and cozaar. //JF           Electronically signed by: Josiah Malhotra RN on 11/26/2021 at 10:46 AM

## 2021-11-26 NOTE — VIRTUAL HEALTH
[address] [facility],4Th Floor Endovascular Neurosurgery Consult for  1 [facility] Stroke Alert through [address] @ 809pm 11/25/2021 11/25/2021 8:38 PM    Pt Name: Dheeraj Miguel  MRN: [id number]  YOB: 1955  Date of evaluation: 11/25/2021  Primary Care Physician: Gallo Reid  Reason for Evaluation: incidental R pcom aneurysm    71yo male with pmh of psoriasis, dmii, gerd, bph, mitral prolapse. Pt presents with 1 mo of positional vertigo and frontal head pressure, found to be in htn urgency/emergecny and incidental R pcom aneurysm vs infundibulum 2.5mm. sbp on presentation 208. Allergies  has No Known Allergies. Medications  Prior to Admission medications    Medication Sig Start Date End Date Taking? Authorizing Provider   adalimumab (HUMIRA) 40 MG/0.8ML injection Inject 40 mg into the skin once    Historical Provider, MD   methotrexate (RHEUMATREX) 2.5 MG chemo tablet Take 15 mg by mouth once a week sunday    Historical Provider, MD   folic acid (FOLVITE) 1 MG tablet Take 1 mg by mouth daily 6 days a week, not on Sunday. Historical Provider, MD   vitamin D (ERGOCALCIFEROL) 1.25 MG (66429 UT) CAPS capsule Take 50,000 Units by mouth once a week    Historical Provider, MD   betamethasone dipropionate 0.05 % cream Apply topically 2 times daily Apply topically 2 times daily. psoriasis    Historical Provider, MD   Ciclopirox (LOPROX) 0.77 % gel Apply topically 2 times daily psoriasis    Historical Provider, MD   sildenafil (VIAGRA) 100 MG tablet Take 80 mg by mouth as needed for Erectile Dysfunction ed    Historical Provider, MD   pantoprazole (PROTONIX) 20 MG tablet TAKE ONE TABLET BY MOUTH DAILY 3/23/21   Marzetta Pain, APRN - NP   zolpidem (AMBIEN) 5 MG tablet Take 5 mg by mouth At bedtime as needed.     Historical Provider, MD   Multiple Vitamins-Minerals (THERAPEUTIC MULTIVITAMIN-MINERALS) tablet Take 1 tablet by mouth daily    Historical Provider, MD   tadalafil (CIALIS) 5 MG tablet Take 5 mg by mouth as needed for Erectile Dysfunction    Historical Provider, MD    Scheduled Meds:  Continuous Infusions:   niCARdipine       PRN Meds:.sodium chloride flush  Past Medical History   has a past medical history of Diabetes mellitus ([address] ED (erectile dysfunction), GERD (gastroesophageal reflux disease), History of BPH, Mitral valve prolapse, and Psoriasis. Social History  Social History     Socioeconomic History    Marital status: Single     Spouse name: Not on file    Number of children: Not on file    Years of education: Not on file    Highest education level: Not on file   Occupational History    Not on file   Tobacco Use    Smoking status: Never Smoker    Smokeless tobacco: Never Used   Vaping Use    Vaping Use: Never used   Substance and Sexual Activity    Alcohol use: Never    Drug use: Never    Sexual activity: Not on file   Other Topics Concern    Not on file   Social History Narrative    Not on file     Social Determinants of Health     Financial Resource Strain:     Difficulty of Paying Living Expenses: Not on file   Food Insecurity:     Worried About [employer] in the Last Year: Not on file    Sofiya of Food in the Last Year: Not on file   Transportation Needs:     Lack of Transportation (Medical): Not on file    Lack of Transportation (Non-Medical):  Not on file   Physical Activity:     Days of Exercise per Week: Not on file    Minutes of Exercise per Session: Not on file   Stress:     Feeling of Stress : Not on file   Social Connections:     Frequency of Communication with Friends and Family: Not on file    Frequency of Social Gatherings with Friends and Family: Not on file    Attends Restorationist Services: Not on file    Active Member of Clubs or Organizations: Not on file    Attends Club or Organization Meetings: Not on file    Marital Status: Not on file   Intimate Partner Violence:     Fear of Current or Ex-Partner: Not on file    Emotionally Abused: Not on file    Physically Abused: Not on file    Sexually Abused: Not on file   Housing Stability:     Unable to Pay for Housing in the Last Year: Not on file    Number of Places Lived in the Last Year: Not on file    Unstable Housing in the Last Year: Not on file     Family History      Problem Relation Age of Onset    Breast Cancer Mother     Diabetes Mother     Coronary Art Dis Father     Lung Cancer Father     Prostate Cancer Father        OBJECTIVE  Vitals:    21 1819   BP: (!) 186/95   Pulse:    Resp:    Temp:    SpO2: 98%        General:  Gen: normal habitus, NAD  HEENT: NCAT, mucosa moist  Cvs: RRR, S1 S2 normal  Resp: symmetric unlabored breathing  Abd: s/nd/nt  Ext: no edema  Skin: no lesions seen, warm and dry    Neuro:  Gen: awake and alert, oriented x3. Lang/speech: no aphasia or dysarthria. Follows commands. CN: PERRL, EOMI, VFF, V1-3 intact, face symmetric, hearing intact, shoulder shrug symmetric, tongue midline  Motor: grossly 5/5 UE and LE b/l  Sense: LT intact in all 4 ext. Coord: FTN and HTS intact b/l  DTR: deferred  Gait: deferred    NIH Stroke Scale:   1a  Level of consciousness: 0 - alert; keenly responsive   1b. LOC questions:  0 - answers both questions correctly   1c. LOC commands: 0 - performs both tasks correctly   2. Best Gaze: 0 - normal   3. Visual: 0 - no visual loss   4. Facial Palsy: 0 - normal symmetric movement   5a. Motor left arm: 0 - no drift, limb holds 90 (or 45) degrees for full 10 seconds   5b. Motor right arm: 0 - no drift, limb holds 90 (or 45) degrees for full 10 seconds   6a. Motor left le - no drift; leg holds 30 degree position for full 5 seconds   6b  Motor right le - no drift; leg holds 30 degree position for full 5 seconds   7. Limb Ataxia: 0 - absent   8. Sensory: 0 - normal; no sensory loss   9. Best Language:  0 - no aphasia, normal   10. Dysarthria: 0 - normal   11.  Extinction and Inattention: 0 - no abnormality         Total:   0 Premorbid MRS: 0      Imaging:  Images were personally reviewed with PACS used to review images including:  CT brain without contrast: no large territory hypodensity or bleed  CTA imaging: no LVO. R pcom aneurysm vs infundibulum 2.5mm    Assessment  71yo male with pmh of psoriasis, dmii, gerd, bph, mitral prolapse. Pt presents with 1 mo of positional vertigo and frontal head pressure, found to be in htn urgency/emergecny and incidental R pcom aneurysm vs infundibulum 2.5mm. no fx of cerebral aneurysms (father with AAA), never smoker. Plan for outpt dx DSA to eval.    Recommendations:  --no acute dsa or intervention at this time. --admit to [facility] for treatment of bp. No txf  --plan to outpt dsa 11/30/2021 with dr. Blu Live    Discussed with ED Physician    At least 30 min of critical care time spent through telemedicine robot at patient bedside (via interactive/real-time software) as patient is in imminent and life threatening deterioration with further treatment and evaluation. This Virtual Visit was conducted with patient's (and/or legal guardian's) consent, to provide telestroke consultation and necessary medical care.   Time spent examining patient, reviewing the images personally, reviewing the chart, perform high complexity decision making and speaking with the nursing staff regarding recommendations     Louisa Davis MD, PhD  Stroke, Neurocritical Care And/or [address] [place] [address] Stroke Network  [facility]  Electronically signed 11/25/2021 at 8:38 PM

## 2021-11-26 NOTE — PLAN OF CARE
Problem: Falls - Risk of:  Goal: Will remain free from falls  Description: Will remain free from falls  Outcome: Ongoing  Goal: Absence of physical injury  Description: Absence of physical injury  Outcome: Ongoing     Problem: Cardiac:  Goal: Ability to maintain vital signs within normal range will improve  Description: Ability to maintain vital signs within normal range will improve  Outcome: Ongoing

## 2021-11-26 NOTE — FLOWSHEET NOTE
11/26/21 1257   Encounter Summary   Services provided to: Patient   Referral/Consult From: John Paul   Continue Visiting   (11/26/21 V)   Volunteer Visit Yes   Complexity of Encounter Low   Length of Encounter 15 minutes   Spiritual/Scientology   Type Spiritual support   Intervention Communion; Prayer   Sacraments   Communion Patient received communion

## 2021-11-27 ENCOUNTER — APPOINTMENT (OUTPATIENT)
Dept: NON INVASIVE DIAGNOSTICS | Age: 66
DRG: 305 | End: 2021-11-27
Payer: MEDICARE

## 2021-11-27 VITALS
RESPIRATION RATE: 16 BRPM | WEIGHT: 203.26 LBS | DIASTOLIC BLOOD PRESSURE: 87 MMHG | OXYGEN SATURATION: 91 % | BODY MASS INDEX: 29.1 KG/M2 | SYSTOLIC BLOOD PRESSURE: 134 MMHG | HEIGHT: 70 IN | HEART RATE: 75 BPM | TEMPERATURE: 97.3 F

## 2021-11-27 LAB
ANION GAP SERPL CALCULATED.3IONS-SCNC: 11 MMOL/L (ref 9–17)
BUN BLDV-MCNC: 16 MG/DL (ref 8–23)
BUN/CREAT BLD: ABNORMAL (ref 9–20)
CALCIUM SERPL-MCNC: 9.4 MG/DL (ref 8.6–10.4)
CHLORIDE BLD-SCNC: 101 MMOL/L (ref 98–107)
CO2: 25 MMOL/L (ref 20–31)
CREAT SERPL-MCNC: 0.71 MG/DL (ref 0.7–1.2)
GFR AFRICAN AMERICAN: >60 ML/MIN
GFR NON-AFRICAN AMERICAN: >60 ML/MIN
GFR SERPL CREATININE-BSD FRML MDRD: ABNORMAL ML/MIN/{1.73_M2}
GFR SERPL CREATININE-BSD FRML MDRD: ABNORMAL ML/MIN/{1.73_M2}
GLUCOSE BLD-MCNC: 137 MG/DL (ref 70–99)
HCT VFR BLD CALC: 48.9 % (ref 41–53)
HEMOGLOBIN: 16.9 G/DL (ref 13.5–17.5)
LV EF: 65 %
LVEF MODALITY: NORMAL
MCH RBC QN AUTO: 31.1 PG (ref 26–34)
MCHC RBC AUTO-ENTMCNC: 34.6 G/DL (ref 31–37)
MCV RBC AUTO: 89.9 FL (ref 80–100)
NRBC AUTOMATED: NORMAL PER 100 WBC
PDW BLD-RTO: 14.3 % (ref 11.5–14.9)
PLATELET # BLD: 177 K/UL (ref 150–450)
PMV BLD AUTO: 8.7 FL (ref 6–12)
POTASSIUM SERPL-SCNC: 4.2 MMOL/L (ref 3.7–5.3)
RBC # BLD: 5.44 M/UL (ref 4.5–5.9)
SODIUM BLD-SCNC: 137 MMOL/L (ref 135–144)
WBC # BLD: 5.5 K/UL (ref 3.5–11)

## 2021-11-27 PROCEDURE — 6370000000 HC RX 637 (ALT 250 FOR IP): Performed by: FAMILY MEDICINE

## 2021-11-27 PROCEDURE — 6360000002 HC RX W HCPCS: Performed by: EMERGENCY MEDICINE

## 2021-11-27 PROCEDURE — 2580000003 HC RX 258: Performed by: EMERGENCY MEDICINE

## 2021-11-27 PROCEDURE — 80048 BASIC METABOLIC PNL TOTAL CA: CPT

## 2021-11-27 PROCEDURE — 85027 COMPLETE CBC AUTOMATED: CPT

## 2021-11-27 PROCEDURE — 93306 TTE W/DOPPLER COMPLETE: CPT

## 2021-11-27 PROCEDURE — 6370000000 HC RX 637 (ALT 250 FOR IP): Performed by: EMERGENCY MEDICINE

## 2021-11-27 PROCEDURE — 36415 COLL VENOUS BLD VENIPUNCTURE: CPT

## 2021-11-27 RX ORDER — AMLODIPINE BESYLATE 10 MG/1
10 TABLET ORAL DAILY
Qty: 30 TABLET | Refills: 1 | Status: SHIPPED | OUTPATIENT
Start: 2021-11-28

## 2021-11-27 RX ORDER — HYDRALAZINE HYDROCHLORIDE 25 MG/1
25 TABLET, FILM COATED ORAL EVERY 8 HOURS SCHEDULED
Qty: 90 TABLET | Refills: 3 | Status: SHIPPED | OUTPATIENT
Start: 2021-11-27

## 2021-11-27 RX ORDER — HYDROCHLOROTHIAZIDE 12.5 MG/1
12.5 TABLET ORAL DAILY
Qty: 30 TABLET | Refills: 1 | Status: SHIPPED | OUTPATIENT
Start: 2021-11-28

## 2021-11-27 RX ORDER — LOSARTAN POTASSIUM 100 MG/1
100 TABLET ORAL DAILY
Qty: 30 TABLET | Refills: 1 | Status: SHIPPED | OUTPATIENT
Start: 2021-11-28

## 2021-11-27 RX ADMIN — HYDRALAZINE HYDROCHLORIDE 25 MG: 25 TABLET, FILM COATED ORAL at 12:16

## 2021-11-27 RX ADMIN — ENOXAPARIN SODIUM 40 MG: 100 INJECTION SUBCUTANEOUS at 08:22

## 2021-11-27 RX ADMIN — HYDROCHLOROTHIAZIDE 12.5 MG: 25 TABLET ORAL at 08:22

## 2021-11-27 RX ADMIN — PANTOPRAZOLE SODIUM 20 MG: 20 TABLET, DELAYED RELEASE ORAL at 08:22

## 2021-11-27 RX ADMIN — AMLODIPINE BESYLATE 10 MG: 5 TABLET ORAL at 08:23

## 2021-11-27 RX ADMIN — LOSARTAN POTASSIUM 100 MG: 100 TABLET, FILM COATED ORAL at 08:24

## 2021-11-27 RX ADMIN — HYDRALAZINE HYDROCHLORIDE 25 MG: 25 TABLET, FILM COATED ORAL at 06:56

## 2021-11-27 RX ADMIN — SODIUM CHLORIDE, PRESERVATIVE FREE 10 ML: 5 INJECTION INTRAVENOUS at 08:20

## 2021-11-27 ASSESSMENT — PAIN SCALES - GENERAL: PAINLEVEL_OUTOF10: 0

## 2021-11-27 NOTE — H&P
Hospital Medicine  History and Physical                                                                                                                                                 Full Code    Patient:  Matti Rater  MRN: [id number]                                                                                                                                                                     CHIEF COMPLAINT: dizziness    History Obtained From:  patient  PCP: Priyanka Vallejo    HISTORY OF PRESENT ILLNESS:   The patient is a 73 y.o. male who presents with h/o of dizziness,pt says he has been experiencing dizziness for last 1 month. Pt also had blood which was elevated, denies any vision problem,no chest pain, no sob    Past Medical History:        Diagnosis Date    Diabetes mellitus ([address]     prediabetes    ED (erectile dysfunction)     GERD (gastroesophageal reflux disease)     History of BPH     Mitral valve prolapse     Psoriasis        Past Surgical History:        Procedure Laterality Date    CHOLECYSTECTOMY      COLONOSCOPY N/A 8/23/2021    COLORECTAL CANCER SCREENING, NOT HIGH RISK performed by Kevon Shaw MD at [facility]      x 3    KNEE ARTHROSCOPY Right 2017    UPPER GASTROINTESTINAL ENDOSCOPY N/A 6/26/2020    EGD DILATION & BIOPSY performed by Marilu Zuñiga MD at [facility] ENDO       Medications Prior to Admission:    Prior to Admission medications    Medication Sig Start Date End Date Taking? Authorizing Provider   vitamin D (ERGOCALCIFEROL) 1.25 MG (23315 UT) CAPS capsule Take 50,000 Units by mouth once a week    Historical Provider, MD   pantoprazole (PROTONIX) 20 MG tablet TAKE ONE TABLET BY MOUTH DAILY 3/23/21   MICHAEL Woods - NP   zolpidem (AMBIEN) 5 MG tablet Take 5 mg by mouth At bedtime as needed.     Historical Provider, MD   Multiple Vitamins-Minerals (THERAPEUTIC MULTIVITAMIN-MINERALS) tablet Take 1 tablet by mouth daily    Historical Provider, MD   tadalafil (CIALIS) 5 MG tablet Take 5 mg by mouth as needed for Erectile Dysfunction    Historical Provider, MD       Current meds  Scheduled Meds:   pantoprazole  20 mg Oral Daily    hydroCHLOROthiazide  12.5 mg Oral Daily    losartan  100 mg Oral Daily    amLODIPine  10 mg Oral Daily    sodium chloride flush  5-40 mL IntraVENous 2 times per day    enoxaparin  40 mg SubCUTAneous Daily     Continuous Infusions:   sodium chloride       PRN Meds:.zolpidem, sodium chloride flush, sodium chloride flush, sodium chloride, acetaminophen, ondansetron **OR** ondansetron    Allergies:  Patient has no known allergies. Social History:   TOBACCO:   reports that he has never smoked. He has never used smokeless tobacco.  ETOH:   reports no history of alcohol use.   OCCUPATION:      Family History:       Problem Relation Age of Onset    Breast Cancer Mother     Diabetes Mother     Coronary Art Dis Father     Lung Cancer Father     Prostate Cancer Father        REVIEW OF SYSTEMS:  Constitutional:  negative for  fevers, chills, sweats and weight loss  HEENT:  negative for  hearing loss, ear drainage, nasal congestion, snoring, hoarseness and voice change  Neck:  No swollen glands and No h/o goiter or thyroid disease  Cardiac:  negative for  chest pain, dyspnea, palpitations, orthopnea, PND, edema  Respiratory:  negative for  dry cough, cough with sputum, dyspnea, wheezing and hemoptysis  Gastrointestinal:  negative for nausea, vomiting, change in bowel habits, diarrhea, constipation, abdominal pain and hemtochezia  :  negative for frequency, dysuria, urinary incontinence, hesitancy, decreased stream and hematuria  Musculoskeletal:  negative for  myalgias, arthralgias, joint swelling and stiff joints  Neuro:  See hpi      Physical Exam:    Vitals: BP (!) 150/90   Pulse 65   Temp 98.3 °F (36.8 °C) (Oral)   Resp 14   Ht 5' 10\" (1.778 m)   Wt 203 lb 4.2 oz (92.2 kg)   SpO2 95%   BMI 29.17 kg/m² General appearance: alert, appears stated age and cooperative  Skin: Skin color, texture, turgor normal. No rashes or lesions  HEENT: Head: Normocephalic, no lesions, without obvious abnormality. Eye: Normal external eye, conjunctiva, lids cornea, SHANELLE  Neck: no adenopathy, no carotid bruit, no JVD, supple, symmetrical, trachea midline and thyroid not enlarged, symmetric, no tenderness/mass/nodules  Lungs: clear to auscultation bilaterally  Heart: regular rate and rhythm, S1, S2 normal, no murmur, click, rub or gallop  Abdomen: soft, non-tender; bowel sounds normal; no masses,  no organomegaly  Extremities: extremities normal, atraumatic, no cyanosis or edema  Neurologic: Mental status: Alert, oriented, thought content appropriate    CBC:   Recent Labs     11/25/21  1807   WBC 5.3   HGB 15.5        BMP:    Recent Labs     11/25/21  1807      K 3.7      CO2 24   BUN 13   CREATININE 0.74   GLUCOSE 156*     Hepatic: No results for input(s): AST, ALT, ALB, BILITOT, ALKPHOS in the last 72 hours. Troponin: No results for input(s): TROPONINI in the last 72 hours. BNP: No results for input(s): BNP in the last 72 hours. Lipids: No results for input(s): CHOL, HDL in the last 72 hours. Invalid input(s): LDLCALCU  ABGs: No results found for: PHART, PO2ART, AFB2PEI  INR: No results for input(s): INR in the last 72 hours. -----------------------------------------------------------------  PA/lat CXR: CT Head WO Contrast    Result Date: 11/25/2021  EXAMINATION: CT OF THE HEAD WITHOUT CONTRAST  11/25/2021 6:33 pm TECHNIQUE: CT of the head was performed without the administration of intravenous contrast. Dose modulation, iterative reconstruction, and/or weight based adjustment of the mA/kV was utilized to reduce the radiation dose to as low as reasonably achievable. COMPARISON: None.  HISTORY: ORDERING SYSTEM PROVIDED HISTORY: dizziness TECHNOLOGIST PROVIDED HISTORY: dizziness Decision Support Exception - unselect if not a suspected or confirmed emergency medical condition->Emergency Medical Condition (MA) Reason for Exam: Dizziness Acuity: Acute Type of Exam: Initial FINDINGS: BRAIN/VENTRICLES: There is no acute intracranial hemorrhage, mass effect or midline shift. No abnormal extra-axial fluid collection. The gray-white differentiation is maintained without evidence of an acute infarct. There is no evidence of hydrocephalus. ORBITS: The visualized portion of the orbits demonstrate no acute abnormality. SINUSES: The visualized paranasal sinuses and mastoid air cells demonstrate no acute abnormality. SOFT TISSUES/SKULL:  No acute abnormality of the visualized skull or soft tissues. No acute intracranial abnormality. XR CHEST PORTABLE    Result Date: 11/25/2021  EXAMINATION: ONE XRAY VIEW OF THE CHEST 11/25/2021 6:30 pm COMPARISON: 06/17/2020 HISTORY: ORDERING SYSTEM PROVIDED HISTORY: covid-19 TECHNOLOGIST PROVIDED HISTORY: covid-19 Reason for Exam: Pt states he has high BP and dizziness Acuity: Acute Type of Exam: Initial Additional signs and symptoms: Pt states he has high BP and dizziness FINDINGS: The lungs are clear. The cardiac and mediastinal contours are stable with persistent cardiomegaly. There is no pleural effusion or pneumothorax. No acute osseous abnormality is identified. No acute cardiopulmonary abnormality. CTA HEAD NECK W CONTRAST    Result Date: 11/25/2021  EXAMINATION: CTA OF THE HEAD AND NECK WITH CONTRAST 11/25/2021 6:33 pm: TECHNIQUE: CTA of the head and neck was performed with the administration of intravenous contrast. Multiplanar reformatted images are provided for review. MIP images are provided for review. Stenosis of the internal carotid arteries measured using NASCET criteria. Dose modulation, iterative reconstruction, and/or weight based adjustment of the mA/kV was utilized to reduce the radiation dose to as low as reasonably achievable. COMPARISON: None.  HISTORY: ORDERING SYSTEM PROVIDED HISTORY: dizziness and hypertensive urgency. TECHNOLOGIST PROVIDED HISTORY: dizziness and hypertensive urgency. Decision Support Exception - unselect if not a suspected or confirmed emergency medical condition->Emergency Medical Condition (MA) Reason for Exam: Dizziness Acuity: Acute Type of Exam: Initial FINDINGS: CTA NECK: AORTIC ARCH/ARCH VESSELS: No dissection or arterial injury. No significant stenosis of the brachiocephalic or subclavian arteries. CAROTID ARTERIES: No dissection, arterial injury, or hemodynamically significant stenosis by NASCET criteria. VERTEBRAL ARTERIES: No dissection, arterial injury, or significant stenosis. SOFT TISSUES: The lung apices are clear. No cervical or superior mediastinal lymphadenopathy. The larynx and pharynx are unremarkable. No acute abnormality of the salivary and thyroid glands. BONES: No acute osseous abnormality. CTA HEAD: ANTERIOR CIRCULATION: No significant stenosis of the intracranial internal carotid, anterior cerebral, or middle cerebral arteries. 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 L[place]a [place] POSTERIOR CIRCULATION: No significant stenosis of the vertebral, basilar, or posterior cerebral arteries. No aneurysm. Left vertebral artery significantly decreases diameter after separation of left PICA. OTHER: No dural venous sinus thrombosis on this non-dedicated study. BRAIN: No mass effect or midline shift. No extra-axial fluid collection. The gray-white differentiation is maintained. For detailed description please refer to the same-day head CT report. No hemodynamically significant stenosis within the head and neck vasculature. 2.5 mm inferiorly projecting aneurysm is arising from the right supraclinoid carotid artery near the carotid terminus.        EKG: Normal sinus rhythm  Possible Left atrial enlargement  Borderline ECG  When compared with ECG of

## 2021-11-27 NOTE — DISCHARGE SUMMARY
Hospitalist Discharge Summary    Jasmin Chandra  :  1955  MRN:  [id number]    Admit date:  2021  Discharge date:   21    Admitting Physician:  Preethi Tong MD    Discharge Diagnoses:   Patient Active Problem List   Diagnosis    Hypertensive urgency        Admission Condition:  fair      Discharged Condition:  good    Hospital Course/Treatments   admitted with dizziness, pt was found to hv a smalla neyrsm in cliniod  area of carotid, pt bp  meds were adjusted, pt did well and pt was d/c with following meds    Discharge Medications:         Medication List        START taking these medications      amLODIPine 10 MG tablet  Commonly known as: NORVASC  Take 1 tablet by mouth daily     hydrALAZINE 25 MG tablet  Commonly known as: APRESOLINE  Take 1 tablet by mouth every 8 hours     hydroCHLOROthiazide 12.5 MG tablet  Commonly known as: HYDRODIURIL  Take 1 tablet by mouth daily     losartan 100 MG tablet  Commonly known as: COZAAR  Take 1 tablet by mouth daily            CONTINUE taking these medications      pantoprazole 20 MG tablet  Commonly known as: PROTONIX  TAKE ONE TABLET BY MOUTH DAILY     tadalafil 5 MG tablet  Commonly known as: CIALIS     therapeutic multivitamin-minerals tablet               Where to Get Your Medications        These medications were sent to [address]  [facility] [phone]   [facility] [address]      Phone: [phone]   amLODIPine 10 MG tablet  hydrALAZINE 25 MG tablet  hydroCHLOROthiazide 12.5 MG tablet  losartan 100 MG tablet           Consults:  IP CONSULT TO INTERNAL MEDICINE    Significant Diagnostic Studies:  CT Head WO Contrast    Result Date: 2021  EXAMINATION: CT OF THE HEAD WITHOUT CONTRAST  2021 6:33 pm TECHNIQUE: CT of the head was performed without the administration of intravenous contrast. Dose modulation, iterative reconstruction, and/or weight based adjustment of the mA/kV was utilized to reduce the radiation dose to as low as reasonably achievable. COMPARISON: None. HISTORY: ORDERING SYSTEM PROVIDED HISTORY: dizziness TECHNOLOGIST PROVIDED HISTORY: dizziness Decision Support Exception - unselect if not a suspected or confirmed emergency medical condition->Emergency Medical Condition (MA) Reason for Exam: Dizziness Acuity: Acute Type of Exam: Initial FINDINGS: BRAIN/VENTRICLES: There is no acute intracranial hemorrhage, mass effect or midline shift. No abnormal extra-axial fluid collection. The gray-white differentiation is maintained without evidence of an acute infarct. There is no evidence of hydrocephalus. ORBITS: The visualized portion of the orbits demonstrate no acute abnormality. SINUSES: The visualized paranasal sinuses and mastoid air cells demonstrate no acute abnormality. SOFT TISSUES/SKULL:  No acute abnormality of the visualized skull or soft tissues. No acute intracranial abnormality. XR CHEST PORTABLE    Result Date: 11/25/2021  EXAMINATION: ONE XRAY VIEW OF THE CHEST 11/25/2021 6:30 pm COMPARISON: 06/17/2020 HISTORY: ORDERING SYSTEM PROVIDED HISTORY: covid-19 TECHNOLOGIST PROVIDED HISTORY: covid-19 Reason for Exam: Pt states he has high BP and dizziness Acuity: Acute Type of Exam: Initial Additional signs and symptoms: Pt states he has high BP and dizziness FINDINGS: The lungs are clear. The cardiac and mediastinal contours are stable with persistent cardiomegaly. There is no pleural effusion or pneumothorax. No acute osseous abnormality is identified. No acute cardiopulmonary abnormality. CTA HEAD NECK W CONTRAST    Result Date: 11/25/2021  EXAMINATION: CTA OF THE HEAD AND NECK WITH CONTRAST 11/25/2021 6:33 pm: TECHNIQUE: CTA of the head and neck was performed with the administration of intravenous contrast. Multiplanar reformatted images are provided for review. MIP images are provided for review.  Stenosis of the internal carotid arteries measured using NASCET criteria. Dose modulation, iterative reconstruction, and/or weight based adjustment of the mA/kV was utilized to reduce the radiation dose to as low as reasonably achievable. COMPARISON: None. HISTORY: ORDERING SYSTEM PROVIDED HISTORY: dizziness and hypertensive urgency. TECHNOLOGIST PROVIDED HISTORY: dizziness and hypertensive urgency. Decision Support Exception - unselect if not a suspected or confirmed emergency medical condition->Emergency Medical Condition (MA) Reason for Exam: Dizziness Acuity: Acute Type of Exam: Initial FINDINGS: CTA NECK: AORTIC ARCH/ARCH VESSELS: No dissection or arterial injury. No significant stenosis of the brachiocephalic or subclavian arteries. CAROTID ARTERIES: No dissection, arterial injury, or hemodynamically significant stenosis by NASCET criteria. VERTEBRAL ARTERIES: No dissection, arterial injury, or significant stenosis. SOFT TISSUES: The lung apices are clear. No cervical or superior mediastinal lymphadenopathy. The larynx and pharynx are unremarkable. No acute abnormality of the salivary and thyroid glands. BONES: No acute osseous abnormality. CTA HEAD: ANTERIOR CIRCULATION: No significant stenosis of the intracranial internal carotid, anterior cerebral, or middle cerebral arteries. 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 Anthonette Larissa POSTERIOR CIRCULATION: No significant stenosis of the vertebral, basilar, or posterior cerebral arteries. No aneurysm. Left vertebral artery significantly decreases diameter after separation of left PICA. OTHER: No dural venous sinus thrombosis on this non-dedicated study. BRAIN: No mass effect or midline shift. No extra-axial fluid collection. The gray-white differentiation is maintained. For detailed description please refer to the same-day head CT report.      No hemodynamically significant stenosis within the head and neck vasculature. 2.5 mm inferiorly projecting aneurysm is arising from the right supraclinoid carotid artery near the carotid terminus. Disposition:   home    Discharge Instructions: Activity: activity as tolerated  Diet:  regular diet    Follow up with Gordo Limon in 2 weeks.     Signed:  Kobe Taylor MD  11/27/2021, 10:45 AM    Time spent in discharge of this pt is more than 30 minutes in examination,evaluvation,  counseling and review of medication and discharge plan

## 2021-11-27 NOTE — CARE COORDINATION
ONGOING DISCHARGE PLAN:    Patient is alert and oriented x4. Spoke with patient regarding discharge plan and patient confirms that plan is still to return to home w/ SO.     BP is improved. Remains on Norvasc & Cozaar. Anticipate DC today w/ no needs. Will continue to follow for additional discharge needs.     Electronically signed by Luciano Haynes RN on 11/27/2021 at 11:02 AM

## 2021-11-27 NOTE — PLAN OF CARE
Safety maintained, call light is within reach, no s/s or c/o distress, bed is low/locked, side rails are up x2  Problem: Falls - Risk of:  Goal: Will remain free from falls  Description: Will remain free from falls  11/27/2021 0346 by Choco Jiménez RN  Outcome: Ongoing  11/26/2021 1942 by Pedro Meza RN  Outcome: Ongoing  Goal: Absence of physical injury  Description: Absence of physical injury  11/27/2021 0346 by Choco Jiménez RN  Outcome: Ongoing  11/26/2021 1942 by Pedro Meza RN  Outcome: Ongoing

## 2021-11-30 ENCOUNTER — HOSPITAL ENCOUNTER (OUTPATIENT)
Dept: INTERVENTIONAL RADIOLOGY/VASCULAR | Age: 66
Discharge: HOME | End: 2021-12-02
Payer: MEDICARE

## 2021-11-30 VITALS
SYSTOLIC BLOOD PRESSURE: 142 MMHG | WEIGHT: 190 LBS | BODY MASS INDEX: 27.2 KG/M2 | HEART RATE: 88 BPM | TEMPERATURE: 98.2 F | OXYGEN SATURATION: 96 % | HEIGHT: 70 IN | RESPIRATION RATE: 22 BRPM | DIASTOLIC BLOOD PRESSURE: 70 MMHG

## 2021-11-30 DIAGNOSIS — I67.1 CEREBRAL ANEURYSM: ICD-10-CM

## 2021-11-30 LAB
GFR NON-AFRICAN AMERICAN: >60 ML/MIN
GFR SERPL CREATININE-BSD FRML MDRD: >60 ML/MIN
GFR SERPL CREATININE-BSD FRML MDRD: NORMAL ML/MIN/{1.73_M2}
GLUCOSE BLD-MCNC: 151 MG/DL (ref 74–100)
POC BUN: 29 MG/DL (ref 8–26)
POC CHLORIDE: 105 MMOL/L (ref 98–107)
POC CREATININE: 0.72 MG/DL (ref 0.51–1.19)
POC HEMATOCRIT: 53 % (ref 41–53)
POC HEMOGLOBIN: 18 G/DL (ref 13.5–17.5)
POC POTASSIUM: 4.1 MMOL/L (ref 3.5–4.5)
POC SODIUM: 139 MMOL/L (ref 138–146)

## 2021-11-30 PROCEDURE — 7100000001 HC PACU RECOVERY - ADDTL 15 MIN

## 2021-11-30 PROCEDURE — C1887 CATHETER, GUIDING: HCPCS

## 2021-11-30 PROCEDURE — 7100000000 HC PACU RECOVERY - FIRST 15 MIN

## 2021-11-30 PROCEDURE — 6360000004 HC RX CONTRAST MEDICATION: Performed by: PSYCHIATRY & NEUROLOGY

## 2021-11-30 PROCEDURE — 6370000000 HC RX 637 (ALT 250 FOR IP): Performed by: STUDENT IN AN ORGANIZED HEALTH CARE EDUCATION/TRAINING PROGRAM

## 2021-11-30 PROCEDURE — 6370000000 HC RX 637 (ALT 250 FOR IP)

## 2021-11-30 PROCEDURE — 82565 ASSAY OF CREATININE: CPT

## 2021-11-30 PROCEDURE — 82435 ASSAY OF BLOOD CHLORIDE: CPT

## 2021-11-30 PROCEDURE — 99152 MOD SED SAME PHYS/QHP 5/>YRS: CPT | Performed by: PSYCHIATRY & NEUROLOGY

## 2021-11-30 PROCEDURE — 84132 ASSAY OF SERUM POTASSIUM: CPT

## 2021-11-30 PROCEDURE — 6360000002 HC RX W HCPCS: Performed by: PSYCHIATRY & NEUROLOGY

## 2021-11-30 PROCEDURE — C1760 CLOSURE DEV, VASC: HCPCS

## 2021-11-30 PROCEDURE — 84520 ASSAY OF UREA NITROGEN: CPT

## 2021-11-30 PROCEDURE — 99153 MOD SED SAME PHYS/QHP EA: CPT

## 2021-11-30 PROCEDURE — 6360000002 HC RX W HCPCS: Performed by: STUDENT IN AN ORGANIZED HEALTH CARE EDUCATION/TRAINING PROGRAM

## 2021-11-30 PROCEDURE — C1769 GUIDE WIRE: HCPCS

## 2021-11-30 PROCEDURE — C1894 INTRO/SHEATH, NON-LASER: HCPCS

## 2021-11-30 PROCEDURE — 2709999900 HC NON-CHARGEABLE SUPPLY

## 2021-11-30 PROCEDURE — 82947 ASSAY GLUCOSE BLOOD QUANT: CPT

## 2021-11-30 PROCEDURE — 2580000003 HC RX 258: Performed by: PSYCHIATRY & NEUROLOGY

## 2021-11-30 PROCEDURE — 85014 HEMATOCRIT: CPT

## 2021-11-30 PROCEDURE — 99152 MOD SED SAME PHYS/QHP 5/>YRS: CPT

## 2021-11-30 PROCEDURE — 84295 ASSAY OF SERUM SODIUM: CPT

## 2021-11-30 PROCEDURE — 36226 PLACE CATH VERTEBRAL ART: CPT | Performed by: PSYCHIATRY & NEUROLOGY

## 2021-11-30 PROCEDURE — 76377 3D RENDER W/INTRP POSTPROCES: CPT | Performed by: PSYCHIATRY & NEUROLOGY

## 2021-11-30 PROCEDURE — 36224 PLACE CATH CAROTD ART: CPT | Performed by: PSYCHIATRY & NEUROLOGY

## 2021-11-30 RX ORDER — ZOLPIDEM TARTRATE 10 MG/1
10 TABLET ORAL NIGHTLY PRN
COMMUNITY

## 2021-11-30 RX ORDER — ASCORBIC ACID 500 MG
500 TABLET ORAL DAILY
COMMUNITY

## 2021-11-30 RX ORDER — MIDAZOLAM HYDROCHLORIDE 2 MG/2ML
INJECTION, SOLUTION INTRAMUSCULAR; INTRAVENOUS
Status: COMPLETED | OUTPATIENT
Start: 2021-11-30 | End: 2021-11-30

## 2021-11-30 RX ORDER — ONDANSETRON 4 MG/1
4 TABLET, ORALLY DISINTEGRATING ORAL EVERY 8 HOURS PRN
Status: DISCONTINUED | OUTPATIENT
Start: 2021-11-30 | End: 2021-12-03 | Stop reason: HOSPADM

## 2021-11-30 RX ORDER — IODIXANOL 270 MG/ML
64 INJECTION, SOLUTION INTRAVASCULAR
Status: COMPLETED | OUTPATIENT
Start: 2021-11-30 | End: 2021-11-30

## 2021-11-30 RX ORDER — FENTANYL CITRATE 50 UG/ML
INJECTION, SOLUTION INTRAMUSCULAR; INTRAVENOUS
Status: COMPLETED | OUTPATIENT
Start: 2021-11-30 | End: 2021-11-30

## 2021-11-30 RX ORDER — ACETAMINOPHEN 325 MG/1
650 TABLET ORAL EVERY 4 HOURS PRN
Status: DISCONTINUED | OUTPATIENT
Start: 2021-11-30 | End: 2021-12-03 | Stop reason: HOSPADM

## 2021-11-30 RX ORDER — ONDANSETRON 2 MG/ML
4 INJECTION INTRAMUSCULAR; INTRAVENOUS EVERY 6 HOURS PRN
Status: DISCONTINUED | OUTPATIENT
Start: 2021-11-30 | End: 2021-12-03 | Stop reason: HOSPADM

## 2021-11-30 RX ORDER — SODIUM CHLORIDE 9 MG/ML
INJECTION, SOLUTION INTRAVENOUS CONTINUOUS
Status: DISCONTINUED | OUTPATIENT
Start: 2021-11-30 | End: 2021-12-03 | Stop reason: HOSPADM

## 2021-11-30 RX ADMIN — ACETAMINOPHEN 650 MG: 325 TABLET ORAL at 20:07

## 2021-11-30 RX ADMIN — IODIXANOL 64 ML: 270 INJECTION, SOLUTION INTRAVASCULAR at 17:25

## 2021-11-30 RX ADMIN — CEFAZOLIN 2000 MG: 10 INJECTION, POWDER, FOR SOLUTION INTRAVENOUS at 16:19

## 2021-11-30 RX ADMIN — MIDAZOLAM HYDROCHLORIDE 1 MG: 1 INJECTION, SOLUTION INTRAMUSCULAR; INTRAVENOUS at 16:16

## 2021-11-30 RX ADMIN — FENTANYL CITRATE 100 MCG: 50 INJECTION, SOLUTION INTRAMUSCULAR; INTRAVENOUS at 16:16

## 2021-11-30 RX ADMIN — SODIUM CHLORIDE: 9 INJECTION, SOLUTION INTRAVENOUS at 12:50

## 2021-11-30 RX ADMIN — MIDAZOLAM HYDROCHLORIDE 1 MG: 1 INJECTION, SOLUTION INTRAMUSCULAR; INTRAVENOUS at 16:53

## 2021-11-30 ASSESSMENT — PAIN SCALES - GENERAL
PAINLEVEL_OUTOF10: 4
PAINLEVEL_OUTOF10: 0

## 2021-11-30 NOTE — BRIEF OP NOTE
[facility]    NEUROENDOVASCULAR SERVICE: POST-OP NOTE: 11/30/2021    Pt Name: Maria Luz Vallejo  MRN: [id number]  [place]: 1955  Date of Procedure: 11/30/2021  Primary Care Physician: Remy Tadeo      Pre-Procedural Diagnosis: incidental right pcom aneurysm  Post-Procedural Diagnosis: as above      Procedure Performed: cerebral angiogram    Surgeon:   Sergio Quarles MD    Fellow:  MD Lindsay Raygoza MD, PhD    1st Assistant:  Fuad Addison    PRE-PROCEDURAL EXAM:  Prestroke baseline mRS MODIFIED NATO SCORE: 0 - No symptoms at all. Neurological exam performed and unchanged from initial H&P or consult    Anesthesia: IV Moderate Sedation  Complications: none    Intra-Operative EXAM:  Patient sedated with unchanged limited neurological exam    EBL: < less than 50       Cc            Specimens: Were not Obtained  Contrast:     Visipaque 270 low osmolar 64 Cc             Fluoro: 11.9 min    Findings:  Please see dictated Radiology note for further details  --right pcom aneurysm, dimensions length 1.6mm width 2.39mm, height 1.5mm, neck 2.39mm. 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 POST-PROCEDURAL EXAM :   Stable neurological Exam  Neurological exam performed and unchanged from initial H&P or consult    Closure:  right V[place] 5   F        POST-PROCEDURAL MONITORING : see orders  Disposition: Recovery room      Recommendations:  --Back to [facility]  --Do not bend right leg for 3 hours. --Groin checks per protocol. --Peripheral pulse checks per protocol. --SBP goal 100-140  --Follow up with Dr. Alisson Mckee 2 weeks after discharge and Dr. Denia Ferrara 3 months after discharge.     Lindsay Plasencia MD PhD fellow    Sergio Quarles MD   Pager: [phone]  Stroke, [facility]  [facility] [address] [facility] [address] [facility]  Electronically signed 11/30/2021 at

## 2021-11-30 NOTE — PROGRESS NOTES
Received post cerebral angiogram procedure to [facility] room 1. Assessment obtained. Restrictions reviewed with patient. Post procedure pathway initiated. Right groin site soft , safeguard dry and intact. No hematoma noted. Family at side. Patient without complaints. Head of bed flat with right leg straight.

## 2021-11-30 NOTE — H&P
Endovascular Neurosurgery History and Physical      Reason for evaluation: Right PCOM aneurysm vs infundibulum    SUBJECTIVE:   History of Chief Complaint:    Mr. Cindy Magallon is a 74 y/o M with pmh significant for psoriasis, DM II, GERD, BPH, Mitral valve prolapse presented today for DSA to evaluate incidentally found Right PCOM aneurysm vs infundibulum. Patient had 1 month of positional vertigo and right frontal pressure. Allergies  has No Known Allergies. Medications  Prior to Admission medications    Medication Sig Start Date End Date Taking? Authorizing Provider   zolpidem (AMBIEN) 10 MG tablet Take 10 mg by mouth nightly as needed for Sleep.    Yes Historical Provider, MD   Cholecalciferol (VITAMIN D3) 125 MCG (5000 UT) TABS Take 1 tablet by mouth daily   Yes Historical Provider, MD   zinc 50 MG CAPS Take 1 capsule by mouth daily   Yes Historical Provider, MD   ascorbic acid (VITAMIN C) 500 MG tablet Take 500 mg by mouth daily   Yes Historical Provider, MD   hydrALAZINE (APRESOLINE) 25 MG tablet Take 1 tablet by mouth every 8 hours 11/27/21  Yes Ivan Harvey MD   losartan (COZAAR) 100 MG tablet Take 1 tablet by mouth daily 11/28/21  Yes Ivan Harvey MD   amLODIPine (NORVASC) 10 MG tablet Take 1 tablet by mouth daily 11/28/21  Yes Ivan Harvey MD   hydroCHLOROthiazide (HYDRODIURIL) 12.5 MG tablet Take 1 tablet by mouth daily 11/28/21  Yes Ivan Harvey MD   pantoprazole (PROTONIX) 20 MG tablet TAKE ONE TABLET BY MOUTH DAILY 3/23/21  Yes Mer Meadows, APRN - NP   Multiple Vitamins-Minerals (THERAPEUTIC MULTIVITAMIN-MINERALS) tablet Take 1 tablet by mouth daily   Yes Historical Provider, MD   tadalafil (CIALIS) 5 MG tablet Take 5 mg by mouth as needed for Erectile Dysfunction    Historical Provider, MD    Scheduled Meds:  Continuous Infusions:   sodium chloride 75 mL/hr at 11/30/21 1250     PRN Meds:.  Past Medical History   has a past medical history of Diabetes shoulder shrug symmetric, tongue midline  Motor: grossly 5/5 UE and LE b/l  Sense: LT intact in all 4 ext. Coord: FTN and HTS intact b/l  DTR: deferred  Gait: narrow base gait    NIH Stroke Scale:   1a  Level of consciousness: 0 - alert; keenly responsive   1b. LOC questions:  0 - answers both questions correctly   1c. LOC commands: 0 - performs both tasks correctly   2. Best Gaze: 0 - normal   3. Visual: 0 - no visual loss   4. Facial Palsy: 0 - normal symmetric movement   5a. Motor left arm: 0 - no drift, limb holds 90 (or 45) degrees for full 10 seconds   5b. Motor right arm: 0 - no drift, limb holds 90 (or 45) degrees for full 10 seconds   6a. Motor left le - no drift; leg holds 30 degree position for full 5 seconds   6b  Motor right le - no drift; leg holds 30 degree position for full 5 seconds   7. Limb Ataxia: 0 - absent   8. Sensory: 0 - normal; no sensory loss   9. Best Language:  0 - no aphasia, normal   10. Dysarthria: 0 - normal   11. Extinction and Inattention: 0 - no abnormality         Total:   0     MRS: 0      LABS:   Reviewed. Lab Results   Component Value Date    HGB 16.9 2021    WBC 5.5 2021     2021     2021    BUN 16 2021    CREATININE 0.72 2021    AST 16 2020    ALT 18 2020    MG 2.3 2020      Lab Results   Component Value Date    COVID19 Not Detected 2021    COVID19 Not Detected 2020       RADIOLOGY:   Images were personally reviewed including:  CTA head and neck:   No hemodynamically significant stenosis within the head and neck vasculature.       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 ASSESSMENT:    74 y/o M with pmh significant for psoriasis, DM II, GERD, BPH, Mitral valve prolapse presented today for DSA to evaluate incidentally found Right PCOM aneurysm vs infundibulum.    Risks and benefits discussed, risks include but are not limited to bruising, stroke, subarachnoid hemorrhage, death, retroperitoneal hematoma, pseudoaneurysm, lower extremity/renal/peripheral vascular compromise, informed consent obtained. PLAN:   --DSA with minimal IV sedation   --Antibiotics prior to the procedure. Case discussed with Dr. Aaron Torres attending.     Sheron Kinsey MD    Stroke, [facility]  [id number]  Electronically signed 11/30/2021 at 3:39 PM

## 2021-11-30 NOTE — PROGRESS NOTES
Patient admitted, consent signed, all questions answered. Pt ready for procedure. Bed in low position, call light to reach with side rails up 2 of 2. Fiance at bedside with patient.

## 2021-11-30 NOTE — SEDATION DOCUMENTATION
Post Procedure Transfer from IR Table  [x] Tubes and Lines intact     Post Procedure Neuro-Checks/NIHSS/Vitals  [x] Completed post procedure  [x] Completed bedside handoff  [x] Frequency ordered  [x] Verbal communication of frequency      Post Procedure Puncture Site Checks  [] Completed post procedure  [x] Completed bedside handoff  [x] Frequency ordered  [x] Verbal communication of frequency    Post Procedure Pulse  Checks  [x] Completed post procedure  [x] Completed bedside handoff  [x] Frequency ordered  [x] Verbal communication of frequency    Order Set  [] Post Neuro-Endo Procedure  [x] Stroke  [] t-PA     B/P control  [x] Verbal Communication   [x] Order in Care Path    Medication Review  [x] Given during procedure  [x] Current drips/meds/fluids    [] Physician Notified of All changes in Assessment    Family  [x] Location Post Procedure

## 2021-12-01 NOTE — PROGRESS NOTES
Ambulated to bathroom and in halls. Gait steady. . Right groin puncture site and right pedal pulse assessment unchanged. All discharge instructions reviewed, questions answered, paper signed and given copy. Patient discharged per ambulatory with wife and belongings.

## 2021-12-10 ENCOUNTER — OFFICE VISIT (OUTPATIENT)
Dept: NEUROLOGY | Age: 66
End: 2021-12-10
Payer: MEDICARE

## 2021-12-10 VITALS
RESPIRATION RATE: 16 BRPM | HEART RATE: 98 BPM | DIASTOLIC BLOOD PRESSURE: 76 MMHG | SYSTOLIC BLOOD PRESSURE: 133 MMHG | TEMPERATURE: 97 F

## 2021-12-10 DIAGNOSIS — I67.1 CEREBRAL ANEURYSM: Primary | ICD-10-CM

## 2021-12-10 PROCEDURE — G8417 CALC BMI ABV UP PARAM F/U: HCPCS | Performed by: STUDENT IN AN ORGANIZED HEALTH CARE EDUCATION/TRAINING PROGRAM

## 2021-12-10 PROCEDURE — 3017F COLORECTAL CA SCREEN DOC REV: CPT | Performed by: STUDENT IN AN ORGANIZED HEALTH CARE EDUCATION/TRAINING PROGRAM

## 2021-12-10 PROCEDURE — 1036F TOBACCO NON-USER: CPT | Performed by: STUDENT IN AN ORGANIZED HEALTH CARE EDUCATION/TRAINING PROGRAM

## 2021-12-10 PROCEDURE — 4040F PNEUMOC VAC/ADMIN/RCVD: CPT | Performed by: STUDENT IN AN ORGANIZED HEALTH CARE EDUCATION/TRAINING PROGRAM

## 2021-12-10 PROCEDURE — G8484 FLU IMMUNIZE NO ADMIN: HCPCS | Performed by: STUDENT IN AN ORGANIZED HEALTH CARE EDUCATION/TRAINING PROGRAM

## 2021-12-10 PROCEDURE — 1111F DSCHRG MED/CURRENT MED MERGE: CPT | Performed by: STUDENT IN AN ORGANIZED HEALTH CARE EDUCATION/TRAINING PROGRAM

## 2021-12-10 PROCEDURE — G8428 CUR MEDS NOT DOCUMENT: HCPCS | Performed by: STUDENT IN AN ORGANIZED HEALTH CARE EDUCATION/TRAINING PROGRAM

## 2021-12-10 PROCEDURE — 1123F ACP DISCUSS/DSCN MKR DOCD: CPT | Performed by: STUDENT IN AN ORGANIZED HEALTH CARE EDUCATION/TRAINING PROGRAM

## 2021-12-10 PROCEDURE — 99215 OFFICE O/P EST HI 40 MIN: CPT | Performed by: STUDENT IN AN ORGANIZED HEALTH CARE EDUCATION/TRAINING PROGRAM

## 2021-12-10 RX ORDER — TILDRAKIZUMAB-ASMN 100 MG/ML
INJECTION, SOLUTION SUBCUTANEOUS
COMMUNITY

## 2021-12-11 NOTE — PROGRESS NOTES
Endovascular Neurosurgery Clinic Note      Reason for evaluation: f/u dsa, R pcom aneurysm    SUBJECTIVE:   History of Chief Complaint:    71yo male with pmh of htn, psoriasis, dmii, gerd, bph, mitral prolapse, hx 1 mo of positional vertigo and frontal head pressure, incidental R pcom aneurysm 2.4mm confirmed on dsa 11/30/2021. no fx of cerebral aneurysms (father with AAA), never smoker. 2d after the procedure (12/2/2021) pt had x1 ep of LOC after taking ambien. Pt denies any prodrome, wife witnessed the episode and put pt in bed. Pt does not recall the event. Pt awoke the next day with no symptoms. Otherwise pt has been well with no focal symptoms. He continues to have frontal head pressure and dizziness, but it is improved. Allergies  has No Known Allergies. Medications  Prior to Admission medications    Medication Sig Start Date End Date Taking? Authorizing Provider   Tildrakizumab-asmn (ILUMYA) 100 MG/ML SOSY Inject into the skin   Yes Historical Provider, MD   zolpidem (AMBIEN) 10 MG tablet Take 10 mg by mouth nightly as needed for Sleep.    Yes Historical Provider, MD   Cholecalciferol (VITAMIN D3) 125 MCG (5000 UT) TABS Take 1 tablet by mouth daily   Yes Historical Provider, MD   zinc 50 MG CAPS Take 1 capsule by mouth daily   Yes Historical Provider, MD   ascorbic acid (VITAMIN C) 500 MG tablet Take 500 mg by mouth daily   Yes Historical Provider, MD   hydrALAZINE (APRESOLINE) 25 MG tablet Take 1 tablet by mouth every 8 hours 11/27/21  Yes Milli Perez MD   losartan (COZAAR) 100 MG tablet Take 1 tablet by mouth daily 11/28/21  Yes Milli Perez MD   amLODIPine (NORVASC) 10 MG tablet Take 1 tablet by mouth daily 11/28/21  Yes Milli Perez MD   hydroCHLOROthiazide (HYDRODIURIL) 12.5 MG tablet Take 1 tablet by mouth daily 11/28/21  Yes Milli Perez MD   pantoprazole (PROTONIX) 20 MG tablet TAKE ONE TABLET BY MOUTH DAILY 3/23/21  Yes MICHAEL Villar - NP Multiple Vitamins-Minerals (THERAPEUTIC MULTIVITAMIN-MINERALS) tablet Take 1 tablet by mouth daily   Yes Historical Provider, MD   tadalafil (CIALIS) 5 MG tablet Take 5 mg by mouth as needed for Erectile Dysfunction   Yes Historical Provider, MD    Scheduled Meds:  Continuous Infusions:  PRN Meds:.  Past Medical History   has a past medical history of Diabetes mellitus ([facility] [place] [address] ED (erectile dysfunction), GERD (gastroesophageal reflux disease), History of BPH, Hypertension, Mitral valve prolapse, and Psoriasis. Past Surgical History   has a past surgical history that includes Cholecystectomy; hernia repair; Knee arthroscopy (Right, 2017); Upper gastrointestinal endoscopy (N/A, 6/26/2020); Colonoscopy (N/A, 8/23/2021); Endoscopy, colon, diagnostic; and other surgical history (11/30/2021). Social History   reports that he has never smoked. He has never used smokeless tobacco.   reports no history of alcohol use. reports no history of drug use. Family History  family history includes Breast Cancer in his mother; Coronary Art Dis in his father; Diabetes in his mother; [place] in his father; Prostate Cancer in his father. Review of Systems:  CONSTITUTIONAL:  negative for fevers, chills, fatigue and malaise    EYES:  negative for double vision, blurred vision and photophobia     HEENT:  negative for tinnitus, epistaxis and sore throat    RESPIRATORY:  negative for cough, shortness of breath, wheezing    CARDIOVASCULAR:  negative for chest pain, palpitations, syncope, edema    GASTROINTESTINAL:  negative for nausea, vomiting    GENITOURINARY:  negative for incontinence    MUSCULOSKELETAL:  negative for neck or back pain    NEUROLOGICAL:  Negative for weakness and tingling  negative for headaches and dizziness    PSYCHIATRIC:  negative for anxiety      Review of systems otherwise negative.       OBJECTIVE:     Vitals:    12/10/21 1138   BP: 133/76   Pulse: 98   Resp: 16   Temp: 97 °F (36.1 °C) General:  Gen: normal habitus, NAD  HEENT: NCAT, mucosa moist  Cvs: RRR, S1 S2 normal  Resp: symmetric unlabored breathing  Abd: s/nd/nt  Ext: no edema  Skin: no lesions seen, warm and dry    Neuro:  Gen: awake and alert, oriented x3. Lang/speech: no aphasia or dysarthria. Follows commands. CN: PERRL, EOMI, VFF, V1-3 intact, face symmetric, hearing intact, shoulder shrug symmetric, tongue midline  Motor: grossly 5/5 UE and LE b/l  Sense: LT intact in all 4 ext. Coord: FTN and HTS intact b/l  DTR: deferred  Gait: narrow base gait    NIH Stroke Scale:   1a  Level of consciousness: 0 - alert; keenly responsive   1b. LOC questions:  0 - answers both questions correctly   1c. LOC commands: 0 - performs both tasks correctly   2. Best Gaze: 0 - normal   3. Visual: 0 - no visual loss   4. Facial Palsy: 0 - normal symmetric movement   5a. Motor left arm: 0 - no drift, limb holds 90 (or 45) degrees for full 10 seconds   5b. Motor right arm: 0 - no drift, limb holds 90 (or 45) degrees for full 10 seconds   6a. Motor left le - no drift; leg holds 30 degree position for full 5 seconds   6b  Motor right le - no drift; leg holds 30 degree position for full 5 seconds   7. Limb Ataxia: 0 - absent   8. Sensory: 0 - normal; no sensory loss   9. Best Language:  0 - no aphasia, normal   10. Dysarthria: 0 - normal   11. Extinction and Inattention: 0 - no abnormality         Total:   0     MRS: 0      LABS:   Reviewed.   Lab Results   Component Value Date    HGB 16.9 2021    WBC 5.5 2021     2021     2021    BUN 16 2021    CREATININE 0.72 2021    AST 16 2020    ALT 18 2020    MG 2.3 2020      Lab Results   Component Value Date    COVID19 Not Detected 2021    COVID19 Not Detected 2020       RADIOLOGY:   Images were personally reviewed including:  DSA 2021  --Right PCOM aneurysm measuring dimensions length 1.6mm width 2.39mm, height 1.5mm, neck 2.39mm. --B/l middle meningeal arteries noted to be originating from the respective ophthalmic arteries. --Duplicated right superior cerebellar artery. 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 no fx of cerebral aneurysms (father with AAA), never smoker. Pt had x1 ep of LOC after taking ambien. Pt does not recall the event. etio unclear, possible 2/2 medication although pt has taken the medication before with no issues. ddx sz, cardiogenic syncope. PLAN:   --monitor for additional episodes. --f/u dr Chani Whelan next week for discussion of possible aneurysm treatment vs monitoring. Case discussed with Dr. Natacha Durbin attending. Eladia Beltran MD, PhD  Stroke, [facility]  Electronically signed 12/11/2021 at 11:22 AM      Stroke and Neuro-interventional Staff  Service Date: 12/10/2021    Reviewed the patient chart, imaging findings and supervised the fellow /resident in the clinic and discussed the above findings and plan    Ted Morton MD Pager  Office [phone].  Cell [phone]  Stroke, [facility]

## 2021-12-14 ENCOUNTER — OFFICE VISIT (OUTPATIENT)
Dept: NEUROLOGY | Age: 66
End: 2021-12-14
Payer: MEDICARE

## 2021-12-14 VITALS
DIASTOLIC BLOOD PRESSURE: 78 MMHG | HEART RATE: 83 BPM | TEMPERATURE: 97.3 F | SYSTOLIC BLOOD PRESSURE: 135 MMHG | OXYGEN SATURATION: 94 % | BODY MASS INDEX: 27.2 KG/M2 | WEIGHT: 190 LBS | HEIGHT: 70 IN

## 2021-12-14 DIAGNOSIS — I67.1 CEREBRAL ANEURYSM: Primary | ICD-10-CM

## 2021-12-14 PROCEDURE — 3017F COLORECTAL CA SCREEN DOC REV: CPT | Performed by: PSYCHIATRY & NEUROLOGY

## 2021-12-14 PROCEDURE — G8484 FLU IMMUNIZE NO ADMIN: HCPCS | Performed by: PSYCHIATRY & NEUROLOGY

## 2021-12-14 PROCEDURE — 1111F DSCHRG MED/CURRENT MED MERGE: CPT | Performed by: PSYCHIATRY & NEUROLOGY

## 2021-12-14 PROCEDURE — 99215 OFFICE O/P EST HI 40 MIN: CPT | Performed by: PSYCHIATRY & NEUROLOGY

## 2021-12-14 PROCEDURE — 1036F TOBACCO NON-USER: CPT | Performed by: PSYCHIATRY & NEUROLOGY

## 2021-12-14 PROCEDURE — G8427 DOCREV CUR MEDS BY ELIG CLIN: HCPCS | Performed by: PSYCHIATRY & NEUROLOGY

## 2021-12-14 PROCEDURE — 1123F ACP DISCUSS/DSCN MKR DOCD: CPT | Performed by: PSYCHIATRY & NEUROLOGY

## 2021-12-14 PROCEDURE — 4040F PNEUMOC VAC/ADMIN/RCVD: CPT | Performed by: PSYCHIATRY & NEUROLOGY

## 2021-12-14 PROCEDURE — G8417 CALC BMI ABV UP PARAM F/U: HCPCS | Performed by: PSYCHIATRY & NEUROLOGY

## 2021-12-14 NOTE — PROGRESS NOTES
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 no fx of cerebral aneurysms (father with AAA), never smoker. 2d after the procedure (12/2/2021) pt had x1 ep of LOC after taking ambien. Pt denies any prodrome, wife witnessed the episode and put pt in bed. Pt does not recall the event. Pt awoke the next day with no symptoms. Otherwise pt has been well with no focal symptoms. He continues to have frontal head pressure and dizziness, but it is improved. Allergies  has No Known Allergies. Medications  Prior to Admission medications    Medication Sig Start Date End Date Taking? Authorizing Provider   Tildrakizumab-asmn (ILUMYA) 100 MG/ML SOSY Inject into the skin   Yes Historical Provider, MD   zolpidem (AMBIEN) 10 MG tablet Take 10 mg by mouth nightly as needed for Sleep.    Yes Historical Provider, MD   Cholecalciferol (VITAMIN D3) 125 MCG (5000 UT) TABS Take 1 tablet by mouth daily   Yes Historical Provider, MD   zinc 50 MG CAPS Take 1 capsule by mouth daily   Yes Historical Provider, MD   ascorbic acid (VITAMIN C) 500 MG tablet Take 500 mg by mouth daily   Yes Historical Provider, MD   hydrALAZINE (APRESOLINE) 25 MG tablet Take 1 tablet by mouth every 8 hours 11/27/21  Yes Max Gann MD   losartan (COZAAR) 100 MG tablet Take 1 tablet by mouth daily 11/28/21  Yes Max Gann MD   amLODIPine (NORVASC) 10 MG tablet Take 1 tablet by mouth daily 11/28/21  Yes Max Gann MD   hydroCHLOROthiazide (HYDRODIURIL) 12.5 MG tablet Take 1 tablet by mouth daily 11/28/21  Yes Max Gann MD   pantoprazole (PROTONIX) 20 MG tablet TAKE ONE TABLET BY MOUTH DAILY 3/23/21  Yes MICHAEL Woods - NP   Multiple Vitamins-Minerals (THERAPEUTIC MULTIVITAMIN-MINERALS) tablet Take 1 tablet by mouth daily   Yes Historical Provider, MD   tadalafil (CIALIS) 5 MG tablet Take 5 mg by mouth as needed for Erectile Dysfunction   Yes Historical Provider, MD    Scheduled Meds:  Continuous Infusions:  PRN Meds:.  Past Medical History   has a past medical history of Diabetes mellitus ([address] ED (erectile dysfunction), GERD (gastroesophageal reflux disease), History of BPH, Hypertension, Mitral valve prolapse, and Psoriasis. Past Surgical History   has a past surgical history that includes Cholecystectomy; hernia repair; Knee arthroscopy (Right, 2017); Upper gastrointestinal endoscopy (N/A, 6/26/2020); Colonoscopy (N/A, 8/23/2021); Endoscopy, colon, diagnostic; and other surgical history (11/30/2021). Social History   reports that he has never smoked. He has never used smokeless tobacco.   reports no history of alcohol use. reports no history of drug use. Family History  family history includes Breast Cancer in his mother; Coronary Art Dis in his father; Diabetes in his mother; Griselda Bless in his father; Prostate Cancer in his father. Review of Systems:  CONSTITUTIONAL:  negative for fevers, chills, fatigue and malaise    EYES:  negative for double vision, blurred vision and photophobia     HEENT:  negative for tinnitus, epistaxis and sore throat    RESPIRATORY:  negative for cough, shortness of breath, wheezing    CARDIOVASCULAR:  negative for chest pain, palpitations, syncope, edema    GASTROINTESTINAL:  negative for nausea, vomiting    GENITOURINARY:  negative for incontinence    MUSCULOSKELETAL:  negative for neck or back pain    NEUROLOGICAL:  Negative for weakness and tingling  negative for headaches and dizziness    PSYCHIATRIC:  negative for anxiety      Review of systems otherwise negative.       OBJECTIVE:     Vitals:    12/14/21 1519   BP: 135/78   Pulse: 83   Temp: 97.3 °F (36.3 °C)   SpO2: 94%        General:  Gen: normal habitus, NAD  HEENT: NCAT, mucosa moist  Cvs: RRR, S1 S2 normal  Resp: symmetric unlabored breathing  Abd: s/nd/nt  Ext: no edema  Skin: no lesions seen, warm and dry    Neuro:  Gen: awake and alert, oriented x3. Lang/speech: no aphasia or dysarthria. Follows commands. CN: PERRL, EOMI, VFF, V1-3 intact, face symmetric, hearing intact, shoulder shrug symmetric, tongue midline  Motor: grossly 5/5 UE and LE b/l  Sense: LT intact in all 4 ext. Coord: FTN and HTS intact b/l  DTR: deferred  Gait: narrow base gait    NIH Stroke Scale:   1a  Level of consciousness: 0 - alert; keenly responsive   1b. LOC questions:  0 - answers both questions correctly   1c. LOC commands: 0 - performs both tasks correctly   2. Best Gaze: 0 - normal   3. Visual: 0 - no visual loss   4. Facial Palsy: 0 - normal symmetric movement   5a. Motor left arm: 0 - no drift, limb holds 90 (or 45) degrees for full 10 seconds   5b. Motor right arm: 0 - no drift, limb holds 90 (or 45) degrees for full 10 seconds   6a. Motor left le - no drift; leg holds 30 degree position for full 5 seconds   6b  Motor right le - no drift; leg holds 30 degree position for full 5 seconds   7. Limb Ataxia: 0 - absent   8. Sensory: 0 - normal; no sensory loss   9. Best Language:  0 - no aphasia, normal   10. Dysarthria: 0 - normal   11. Extinction and Inattention: 0 - no abnormality         Total:   0     MRS: 0      LABS:   Reviewed. Lab Results   Component Value Date    HGB 16.9 2021    WBC 5.5 2021     2021     2021    BUN 16 2021    CREATININE 0.72 2021    AST 16 2020    ALT 18 2020    MG 2.3 2020      Lab Results   Component Value Date    COVID19 Not Detected 2021    COVID19 Not Detected 2020       RADIOLOGY:   Images were personally reviewed including:  DSA 2021  --Right PCOM aneurysm measuring dimensions length 1.6mm width 2.39mm, height 1.5mm, neck 2.39mm.    --B/l middle meningeal arteries noted to be originating from the respective ophthalmic arteries. --Duplicated right superior cerebellar artery. 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 no fx of cerebral aneurysms (father with AAA), never smoker. Pt had x1 ep of LOC after taking ambien. Pt does not recall the event. etio unclear, possible 2/2 medication although pt has taken the medication before with no issues. ddx sz, cardiogenic syncope. This aneurysm is too small and in part an infundibulum, which make it has lower risk of rupture, and risk out weigh benefit of treating at this size and would recommend monitoring it     PLAN:   --monitor for additional episodes. --no treatment at this point  - CTA annually if it grows will consider treating it  - BP ,130/80 mmHg  - RTC in 12 months    Consultation Visit Time:  40 minutes  Patient given educational materials - see patient instructions. Discussed use, benefit, and side effects of prescribed medications. Personally reviewed imaging with patients and all questions answered. Pt voiced understanding. Patient agreed with treatment plan. Follow up as directed below. Greater than 50% of the time was for counseling and providing answer to the patient question. The findings and the plan discussed with the patient and all her questions were answered. Thank you very much for your referral, please do not hesitate to contact me with any questions.     Herberth Dyson MD Pager: [phone]  Stroke, [facility]  [facility]   [address]

## 2022-10-07 ENCOUNTER — HOSPITAL ENCOUNTER (OUTPATIENT)
Dept: CT IMAGING | Age: 67
Discharge: HOME | End: 2022-10-09
Payer: MEDICARE

## 2022-10-07 ENCOUNTER — HOSPITAL ENCOUNTER (OUTPATIENT)
Age: 67
Discharge: HOME | End: 2022-10-07
Payer: MEDICARE

## 2022-10-07 DIAGNOSIS — I67.1 CEREBRAL ANEURYSM: ICD-10-CM

## 2022-10-07 LAB
CREAT SERPL-MCNC: 0.95 MG/DL (ref 0.7–1.2)
GFR SERPL CREATININE-BSD FRML MDRD: >60 ML/MIN/1.73M2

## 2022-10-07 PROCEDURE — 36415 COLL VENOUS BLD VENIPUNCTURE: CPT

## 2022-10-07 PROCEDURE — 70496 CT ANGIOGRAPHY HEAD: CPT

## 2022-10-07 PROCEDURE — 2580000003 HC RX 258: Performed by: FAMILY MEDICINE

## 2022-10-07 PROCEDURE — 82565 ASSAY OF CREATININE: CPT

## 2022-10-07 PROCEDURE — 6360000004 HC RX CONTRAST MEDICATION: Performed by: FAMILY MEDICINE

## 2022-10-07 RX ORDER — 0.9 % SODIUM CHLORIDE 0.9 %
80 INTRAVENOUS SOLUTION INTRAVENOUS ONCE
Status: COMPLETED | OUTPATIENT
Start: 2022-10-07 | End: 2022-10-07

## 2022-10-07 RX ORDER — SODIUM CHLORIDE 0.9 % (FLUSH) 0.9 %
10 SYRINGE (ML) INJECTION PRN
Status: DISCONTINUED | OUTPATIENT
Start: 2022-10-07 | End: 2022-10-10 | Stop reason: HOSPADM

## 2022-10-07 RX ADMIN — SODIUM CHLORIDE 80 ML: 9 INJECTION, SOLUTION INTRAVENOUS at 13:42

## 2022-10-07 RX ADMIN — IOPAMIDOL 75 ML: 755 INJECTION, SOLUTION INTRAVENOUS at 13:41

## 2022-10-07 RX ADMIN — SODIUM CHLORIDE, PRESERVATIVE FREE 10 ML: 5 INJECTION INTRAVENOUS at 13:42

## 2022-11-07 ENCOUNTER — OFFICE VISIT (OUTPATIENT)
Dept: NEUROLOGY | Age: 67
End: 2022-11-07
Payer: MEDICARE

## 2022-11-07 VITALS
DIASTOLIC BLOOD PRESSURE: 75 MMHG | WEIGHT: 193 LBS | SYSTOLIC BLOOD PRESSURE: 124 MMHG | OXYGEN SATURATION: 96 % | BODY MASS INDEX: 27.63 KG/M2 | HEART RATE: 57 BPM | HEIGHT: 70 IN | TEMPERATURE: 97.2 F

## 2022-11-07 DIAGNOSIS — H53.8 BLURRED VISION: Primary | ICD-10-CM

## 2022-11-07 DIAGNOSIS — I67.1 CEREBRAL ANEURYSM: ICD-10-CM

## 2022-11-07 PROCEDURE — G8484 FLU IMMUNIZE NO ADMIN: HCPCS | Performed by: PSYCHIATRY & NEUROLOGY

## 2022-11-07 PROCEDURE — 1036F TOBACCO NON-USER: CPT | Performed by: PSYCHIATRY & NEUROLOGY

## 2022-11-07 PROCEDURE — 99215 OFFICE O/P EST HI 40 MIN: CPT | Performed by: PSYCHIATRY & NEUROLOGY

## 2022-11-07 PROCEDURE — 3074F SYST BP LT 130 MM HG: CPT | Performed by: PSYCHIATRY & NEUROLOGY

## 2022-11-07 PROCEDURE — 1124F ACP DISCUSS-NO DSCNMKR DOCD: CPT | Performed by: PSYCHIATRY & NEUROLOGY

## 2022-11-07 PROCEDURE — 3078F DIAST BP <80 MM HG: CPT | Performed by: PSYCHIATRY & NEUROLOGY

## 2022-11-07 PROCEDURE — G8417 CALC BMI ABV UP PARAM F/U: HCPCS | Performed by: PSYCHIATRY & NEUROLOGY

## 2022-11-07 PROCEDURE — G8427 DOCREV CUR MEDS BY ELIG CLIN: HCPCS | Performed by: PSYCHIATRY & NEUROLOGY

## 2022-11-07 PROCEDURE — 3017F COLORECTAL CA SCREEN DOC REV: CPT | Performed by: PSYCHIATRY & NEUROLOGY

## 2022-11-07 RX ORDER — FAMOTIDINE 20 MG/1
TABLET, FILM COATED ORAL
COMMUNITY
Start: 2022-09-29

## 2022-11-07 RX ORDER — TRIAMCINOLONE ACETONIDE 0.25 MG/G
CREAM TOPICAL
COMMUNITY
Start: 2020-06-30

## 2022-11-07 RX ORDER — CALCIPOTRIENE 50 UG/G
CREAM TOPICAL
COMMUNITY
Start: 2020-06-30

## 2022-11-07 RX ORDER — TADALAFIL 20 MG/1
TABLET ORAL
COMMUNITY
Start: 2022-09-30

## 2022-11-07 RX ORDER — TILDRAKIZUMAB-ASMN 100 MG/ML
INJECTION, SOLUTION SUBCUTANEOUS
COMMUNITY

## 2022-11-07 RX ORDER — ROSUVASTATIN CALCIUM 5 MG/1
TABLET, COATED ORAL
COMMUNITY
Start: 2022-09-20

## 2022-11-07 RX ORDER — BETAMETHASONE DIPROPIONATE 0.5 MG/G
CREAM TOPICAL
COMMUNITY
Start: 2020-06-30

## 2022-11-07 NOTE — PROGRESS NOTES
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 no fx of cerebral aneurysms (father with AAA), never smoker. 2d after the procedure (12/2/2021) pt had x1 ep of LOC after taking ambien. Pt denies any prodrome, wife witnessed the episode and put pt in bed. Pt does not recall the event. Pt awoke the next day with no symptoms. Otherwise pt has been well with no focal symptoms. He continues to have frontal head pressure and dizziness, but it is improved. Allergies  has No Known Allergies. Medications  Prior to Admission medications    Medication Sig Start Date End Date Taking? Authorizing Provider   famotidine (PEPCID) 20 MG tablet  9/29/22  Yes Historical Provider, MD   tadalafil (CIALIS) 20 MG tablet TAKE 1 TABLET BY MOUTH ONCE DAILY AS NEEDED 9/30/22  Yes Historical Provider, MD   fluocinonide (LIDEX) 0.05 % cream  6/30/20  Yes Historical Provider, MD   rosuvastatin (CRESTOR) 5 MG tablet  9/20/22  Yes Historical Provider, MD   betamethasone dipropionate 0.05 % cream  6/30/20  Yes Historical Provider, MD   triamcinolone (KENALOG) 0.025 % cream  6/30/20  Yes Historical Provider, MD   calcipotriene (DOVONEX) 0.005 % cream  6/30/20  Yes Historical Provider, MD   tildrakizumab-asmn (ILUMYA) 100 MG/ML SOSY prefilled syringe    Yes Historical Provider, MD   zolpidem (AMBIEN) 10 MG tablet Take 10 mg by mouth nightly as needed for Sleep.    Yes Historical Provider, MD   Cholecalciferol (VITAMIN D3) 125 MCG (5000 UT) TABS Take 1 tablet by mouth daily   Yes Historical Provider, MD   zinc 50 MG CAPS Take 1 capsule by mouth daily   Yes Historical Provider, MD   ascorbic acid (VITAMIN C) 500 MG tablet Take 500 mg by mouth daily   Yes Historical Provider, MD   losartan (COZAAR) 100 MG tablet Take 1 tablet by mouth daily 11/28/21  Yes Dora Jaramillo MD   amLODIPine (NORVASC) 10 MG tablet Take 1 tablet by mouth daily 11/28/21  Yes Dora Jaramillo MD   hydroCHLOROthiazide (HYDRODIURIL) 12.5 MG tablet Take 1 tablet by mouth daily 11/28/21  Yes Dora Jaramillo MD   Multiple Vitamins-Minerals (THERAPEUTIC MULTIVITAMIN-MINERALS) tablet Take 1 tablet by mouth daily   Yes Historical Provider, MD   tadalafil (CIALIS) 5 MG tablet Take 5 mg by mouth as needed for Erectile Dysfunction   Yes Historical Provider, MD   Tildrakizumab-asmn Claribel Robertsonless) 100 MG/ML SOSY Inject into the skin  Patient not taking: Reported on 11/7/2022    Historical Provider, MD   hydrALAZINE (APRESOLINE) 25 MG tablet Take 1 tablet by mouth every 8 hours  Patient not taking: Reported on 11/7/2022 11/27/21   Dora Jaramillo MD   pantoprazole (PROTONIX) 20 MG tablet TAKE ONE TABLET BY MOUTH DAILY  Patient not taking: Reported on 11/7/2022 3/23/21   MICHAEL Eller NP    Scheduled Meds:  Continuous Infusions:  PRN Meds:.  Past Medical History   has a past medical history of Diabetes mellitus ([address] ED (erectile dysfunction), GERD (gastroesophageal reflux disease), History of BPH, Hypertension, Mitral valve prolapse, and Psoriasis. Past Surgical History   has a past surgical history that includes Cholecystectomy; hernia repair; Knee arthroscopy (Right, 2017); Upper gastrointestinal endoscopy (N/A, 6/26/2020); Colonoscopy (N/A, 8/23/2021); Endoscopy, colon, diagnostic; and other surgical history (11/30/2021). Social History   reports that he has never smoked. He has never used smokeless tobacco.   reports no history of alcohol use. reports no history of drug use. Family History  family history includes Breast Cancer in his mother; Coronary Art Dis in his father; Diabetes in his mother; Viviana Eric in his father; Prostate Cancer in his father.     Review of Systems:  CONSTITUTIONAL:  negative for fevers, chills, fatigue and malaise    EYES:  negative for double vision, blurred vision and photophobia     HEENT:  negative for tinnitus, epistaxis and sore throat    RESPIRATORY:  negative for cough, shortness of breath, wheezing    CARDIOVASCULAR:  negative for chest pain, palpitations, syncope, edema    GASTROINTESTINAL:  negative for nausea, vomiting    GENITOURINARY:  negative for incontinence    MUSCULOSKELETAL:  negative for neck or back pain    NEUROLOGICAL:  Negative for weakness and tingling  negative for headaches and dizziness    PSYCHIATRIC:  negative for anxiety      Review of systems otherwise negative. OBJECTIVE:     Vitals:    22 1511   BP: 124/75   Pulse: 57   Temp: 97.2 °F (36.2 °C)   SpO2: 96%        General:  Gen: normal habitus, NAD  HEENT: NCAT, mucosa moist  Cvs: RRR, S1 S2 normal  Resp: symmetric unlabored breathing  Abd: s/nd/nt  Ext: no edema  Skin: no lesions seen, warm and dry    Neuro:  Gen: awake and alert, oriented x3. Lang/speech: no aphasia or dysarthria. Follows commands. CN: PERRL, EOMI, VFF, V1-3 intact, face symmetric, hearing intact, shoulder shrug symmetric, tongue midline  Motor: grossly 5/5 UE and LE b/l  Sense: LT intact in all 4 ext. Coord: FTN and HTS intact b/l  DTR: deferred  Gait: narrow base gait    NIH Stroke Scale:   1a  Level of consciousness: 0 - alert; keenly responsive   1b. LOC questions:  0 - answers both questions correctly   1c. LOC commands: 0 - performs both tasks correctly   2. Best Gaze: 0 - normal   3. Visual: 0 - no visual loss   4. Facial Palsy: 0 - normal symmetric movement   5a. Motor left arm: 0 - no drift, limb holds 90 (or 45) degrees for full 10 seconds   5b. Motor right arm: 0 - no drift, limb holds 90 (or 45) degrees for full 10 seconds   6a.  Motor left le - no drift; leg holds 30 degree position for full 5 seconds   6b  Motor right le - no drift; leg holds 30 degree position for full 5 seconds   7. Limb Ataxia: 0 - absent   8. Sensory: 0 - normal; no sensory loss   9. Best Language:  0 - no aphasia, normal   10. Dysarthria: 0 - normal   11. Extinction and Inattention: 0 - no abnormality         Total:   0     MRS: 0      LABS:   Reviewed. Lab Results   Component Value Date    HGB 16.9 11/27/2021    WBC 5.5 11/27/2021     11/27/2021     11/27/2021    BUN 16 11/27/2021    CREATININE 0.95 10/07/2022    AST 16 06/17/2020    ALT 18 06/17/2020    MG 2.3 06/17/2020      Lab Results   Component Value Date/Time    COVID19 Not Detected 08/19/2021 09:41 AM    COVID19 Not Detected 06/22/2020 01:22 PM       RADIOLOGY:   Images were personally reviewed including:  DSA 11/30/2021  --Right PCOM aneurysm measuring dimensions length 1.6mm width 2.39mm, height 1.5mm, neck 2.39mm. --B/l middle meningeal arteries noted to be originating from the respective ophthalmic arteries. --Duplicated right superior cerebellar artery. 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 no fx of cerebral aneurysms (father with AAA), never smoker. Pt had x1 ep of LOC after taking ambien. Pt does not recall the event. etio unclear, possible 2/2 medication although pt has taken the medication before with no issues. ddx sz, cardiogenic syncope. This aneurysm is too small and in part an infundibulum, which make it has lower risk of rupture, and risk out weigh benefit of treating at this size and would recommend monitoring it     PLAN:   --monitor for additional episodes.    --no treatment at this point  - CTA annually if it grows will consider treating it  - BP ,130/80 mmHg  - RTC in 12 months    Consultation Visit Time:  40 minutes  Patient given educational materials - see patient instructions. Discussed use, benefit, and side effects of prescribed medications. Personally reviewed imaging with patients and all questions answered. Pt voiced understanding. Patient agreed with treatment plan. Follow up as directed below. Greater than 50% of the time was for counseling and providing answer to the patient question. The findings and the plan discussed with the patient and all her questions were answered. Thank you very much for your referral, please do not hesitate to contact me with any questions.     Casandra Martínez MD Pager: [phone]  Stroke, [facility]  [facility]   [address]

## 2022-12-15 DIAGNOSIS — I65.23 BILATERAL CAROTID ARTERY STENOSIS: Primary | ICD-10-CM

## 2022-12-15 DIAGNOSIS — I72.8 ANEURYSM OF OTHER SPECIFIED ARTERIES (HCC): ICD-10-CM

## 2022-12-15 DIAGNOSIS — I67.1 CEREBRAL ANEURYSM: ICD-10-CM

## 2022-12-16 ENCOUNTER — HOSPITAL ENCOUNTER (OUTPATIENT)
Dept: VASCULAR LAB | Age: 67
Discharge: HOME | End: 2022-12-16
Payer: MEDICARE

## 2022-12-16 DIAGNOSIS — I72.8 ANEURYSM OF OTHER SPECIFIED ARTERIES (HCC): ICD-10-CM

## 2022-12-16 DIAGNOSIS — I67.1 CEREBRAL ANEURYSM: ICD-10-CM

## 2022-12-16 PROCEDURE — 93880 EXTRACRANIAL BILAT STUDY: CPT

## 2023-09-29 DIAGNOSIS — I72.8 ANEURYSM OF OTHER SPECIFIED ARTERIES (HCC): ICD-10-CM

## 2023-09-29 DIAGNOSIS — I63.89 OTHER CEREBRAL INFARCTION (HCC): ICD-10-CM

## 2023-09-29 DIAGNOSIS — I72.8 ANEURYSM OF SUBCLAVIAN ARTERY (HCC): ICD-10-CM

## 2023-09-29 DIAGNOSIS — I67.1 CEREBRAL ANEURYSM: Primary | ICD-10-CM

## 2023-09-29 DIAGNOSIS — H53.8 BLURRED VISION: ICD-10-CM

## 2023-10-11 ENCOUNTER — TELEPHONE (OUTPATIENT)
Dept: NEUROSURGERY | Age: 68
End: 2023-10-11

## 2023-10-11 DIAGNOSIS — I67.1 CEREBRAL ANEURYSM: Primary | ICD-10-CM

## 2023-10-12 ENCOUNTER — HOSPITAL ENCOUNTER (OUTPATIENT)
Dept: MRI IMAGING | Age: 68
Discharge: HOME | End: 2023-10-14
Attending: PSYCHIATRY & NEUROLOGY
Payer: MEDICARE

## 2023-10-12 ENCOUNTER — HOSPITAL ENCOUNTER (OUTPATIENT)
Dept: VASCULAR LAB | Age: 68
Discharge: HOME | End: 2023-10-14
Attending: PSYCHIATRY & NEUROLOGY
Payer: MEDICARE

## 2023-10-12 DIAGNOSIS — I63.89 OTHER CEREBRAL INFARCTION (HCC): ICD-10-CM

## 2023-10-12 DIAGNOSIS — I67.1 CEREBRAL ANEURYSM: ICD-10-CM

## 2023-10-12 DIAGNOSIS — H53.8 BLURRED VISION: ICD-10-CM

## 2023-10-12 LAB
EGFR, POC: >60 ML/MIN/1.73M2
POC CREATININE: 0.6 MG/DL (ref 0.51–1.19)
VAS LEFT BULB EDV: 20.8 CM/S
VAS LEFT BULB PSV: 68.1 CM/S
VAS LEFT CCA DIST EDV: 25.2 CM/S
VAS LEFT CCA DIST PSV: 106 CM/S
VAS LEFT CCA MID EDV: 33.28 CM/S
VAS LEFT CCA MID PSV: 133.44 CM/S
VAS LEFT CCA PROX EDV: 33.3 CM/S
VAS LEFT CCA PROX PSV: 136.7 CM/S
VAS LEFT ECA EDV: 13.74 CM/S
VAS LEFT ECA PSV: 104.5 CM/S
VAS LEFT ICA DIST EDV: 27.1 CM/S
VAS LEFT ICA DIST PSV: 75 CM/S
VAS LEFT ICA MID EDV: 24.5 CM/S
VAS LEFT ICA MID PSV: 76.3 CM/S
VAS LEFT ICA PROX EDV: 23.2 CM/S
VAS LEFT ICA PROX PSV: 81.5 CM/S
VAS LEFT ICA/CCA PSV: 0.61 NO UNITS
VAS LEFT VERTEBRAL EDV: 9.91 CM/S
VAS LEFT VERTEBRAL PSV: 48.4 CM/S
VAS RIGHT BULB EDV: 25.7 CM/S
VAS RIGHT BULB PSV: 90.3 CM/S
VAS RIGHT CCA DIST EDV: 27.3 CM/S
VAS RIGHT CCA DIST PSV: 104.9 CM/S
VAS RIGHT CCA MID EDV: 33.76 CM/S
VAS RIGHT CCA MID PSV: 124.23 CM/S
VAS RIGHT CCA PROX EDV: 28.9 CM/S
VAS RIGHT CCA PROX PSV: 129.1 CM/S
VAS RIGHT ECA EDV: 15.99 CM/S
VAS RIGHT ECA PSV: 111.3 CM/S
VAS RIGHT ICA DIST EDV: 29.7 CM/S
VAS RIGHT ICA DIST PSV: 77.6 CM/S
VAS RIGHT ICA MID EDV: 31.9 CM/S
VAS RIGHT ICA MID PSV: 99.3 CM/S
VAS RIGHT ICA PROX EDV: 24.5 CM/S
VAS RIGHT ICA PROX PSV: 86.7 CM/S
VAS RIGHT ICA/CCA PSV: 0.8 NO UNITS
VAS RIGHT VERTEBRAL EDV: 21.73 CM/S
VAS RIGHT VERTEBRAL PSV: 67.1 CM/S

## 2023-10-12 PROCEDURE — 82565 ASSAY OF CREATININE: CPT

## 2023-10-12 PROCEDURE — 93880 EXTRACRANIAL BILAT STUDY: CPT

## 2023-10-12 PROCEDURE — 70546 MR ANGIOGRAPH HEAD W/O&W/DYE: CPT

## 2023-10-12 PROCEDURE — 93880 EXTRACRANIAL BILAT STUDY: CPT | Performed by: SURGERY

## 2023-10-12 PROCEDURE — A9579 GAD-BASE MR CONTRAST NOS,1ML: HCPCS | Performed by: PSYCHIATRY & NEUROLOGY

## 2023-10-12 PROCEDURE — 6360000004 HC RX CONTRAST MEDICATION: Performed by: PSYCHIATRY & NEUROLOGY

## 2023-10-12 RX ORDER — SODIUM CHLORIDE 0.9 % (FLUSH) 0.9 %
10 SYRINGE (ML) INJECTION 2 TIMES DAILY
Status: DISCONTINUED | OUTPATIENT
Start: 2023-10-12 | End: 2023-10-15 | Stop reason: HOSPADM

## 2023-10-12 RX ADMIN — GADOTERIDOL 19 ML: 279.3 INJECTION, SOLUTION INTRAVENOUS at 14:03

## 2023-11-07 ENCOUNTER — OFFICE VISIT (OUTPATIENT)
Dept: NEUROLOGY | Age: 68
End: 2023-11-07
Payer: MEDICARE

## 2023-11-07 VITALS
TEMPERATURE: 97.7 F | DIASTOLIC BLOOD PRESSURE: 77 MMHG | BODY MASS INDEX: 26.63 KG/M2 | HEIGHT: 70 IN | SYSTOLIC BLOOD PRESSURE: 120 MMHG | HEART RATE: 71 BPM | WEIGHT: 186 LBS

## 2023-11-07 DIAGNOSIS — I67.1 CEREBRAL ANEURYSM: Primary | ICD-10-CM

## 2023-11-07 PROCEDURE — 3078F DIAST BP <80 MM HG: CPT | Performed by: PSYCHIATRY & NEUROLOGY

## 2023-11-07 PROCEDURE — G8427 DOCREV CUR MEDS BY ELIG CLIN: HCPCS | Performed by: PSYCHIATRY & NEUROLOGY

## 2023-11-07 PROCEDURE — 99214 OFFICE O/P EST MOD 30 MIN: CPT | Performed by: PSYCHIATRY & NEUROLOGY

## 2023-11-07 PROCEDURE — 1124F ACP DISCUSS-NO DSCNMKR DOCD: CPT | Performed by: PSYCHIATRY & NEUROLOGY

## 2023-11-07 PROCEDURE — G8484 FLU IMMUNIZE NO ADMIN: HCPCS | Performed by: PSYCHIATRY & NEUROLOGY

## 2023-11-07 PROCEDURE — 1036F TOBACCO NON-USER: CPT | Performed by: PSYCHIATRY & NEUROLOGY

## 2023-11-07 PROCEDURE — 3074F SYST BP LT 130 MM HG: CPT | Performed by: PSYCHIATRY & NEUROLOGY

## 2023-11-07 PROCEDURE — 3017F COLORECTAL CA SCREEN DOC REV: CPT | Performed by: PSYCHIATRY & NEUROLOGY

## 2023-11-07 PROCEDURE — G8417 CALC BMI ABV UP PARAM F/U: HCPCS | Performed by: PSYCHIATRY & NEUROLOGY

## 2023-11-07 NOTE — PROGRESS NOTES
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 no fx of cerebral aneurysms (father with AAA), never smoker. 2d after the procedure (12/2/2021) pt had x1 ep of LOC after taking ambien. Pt denies any prodrome, wife witnessed the episode and put pt in bed. Pt does not recall the event. Pt awoke the next day with no symptoms. Otherwise pt has been well with no focal symptoms. He continues to have frontal head pressure and dizziness, but it is improved. Allergies  has No Known Allergies. Medications  Prior to Admission medications    Medication Sig Start Date End Date Taking? Authorizing Provider   fluocinonide (LIDEX) 0.05 % cream  6/30/20  Yes Melba Blanton MD   rosuvastatin (CRESTOR) 5 MG tablet  9/20/22  Yes Melba Blanton MD   betamethasone dipropionate 0.05 % cream  6/30/20  Yes ProviderMelba MD   triamcinolone (KENALOG) 0.025 % cream  6/30/20  Yes ProviderMelba MD   calcipotriene (DOVONEX) 0.005 % cream  6/30/20  Yes Melba Blanton MD   zolpidem (AMBIEN) 10 MG tablet Take 1 tablet by mouth nightly as needed for Sleep.    Yes Melba Blanton MD   Cholecalciferol (VITAMIN D3) 125 MCG (5000 UT) TABS Take 1 tablet by mouth daily   Yes ProviderMelba MD   zinc 50 MG CAPS Take 50 mg by mouth daily   Yes Melba Blanton MD   ascorbic acid (VITAMIN C) 500 MG tablet Take 1 tablet by mouth daily   Yes Melba Blanton MD   hydrALAZINE (APRESOLINE) 25 MG tablet Take 1 tablet by mouth every 8 hours 11/27/21  Yes Gloria Montes MD   losartan (COZAAR) 100 MG tablet Take 1 tablet by mouth daily 11/28/21  Yes Gloria Montes MD   amLODIPine (NORVASC) 10 MG tablet Take 1

## 2024-11-04 ENCOUNTER — HOSPITAL ENCOUNTER (OUTPATIENT)
Dept: CT IMAGING | Age: 69
Discharge: HOME | End: 2024-11-06
Attending: PSYCHIATRY & NEUROLOGY
Payer: MEDICARE

## 2024-11-04 DIAGNOSIS — I67.1 CEREBRAL ANEURYSM: ICD-10-CM

## 2024-11-04 PROCEDURE — 6360000004 HC RX CONTRAST MEDICATION: Performed by: PSYCHIATRY & NEUROLOGY

## 2024-11-04 PROCEDURE — 2580000003 HC RX 258: Performed by: PSYCHIATRY & NEUROLOGY

## 2024-11-04 PROCEDURE — 70496 CT ANGIOGRAPHY HEAD: CPT

## 2024-11-04 RX ORDER — SODIUM CHLORIDE 0.9 % (FLUSH) 0.9 %
10 SYRINGE (ML) INJECTION PRN
Status: DISCONTINUED | OUTPATIENT
Start: 2024-11-04 | End: 2024-11-07 | Stop reason: HOSPADM

## 2024-11-04 RX ORDER — 0.9 % SODIUM CHLORIDE 0.9 %
100 INTRAVENOUS SOLUTION INTRAVENOUS ONCE
Status: COMPLETED | OUTPATIENT
Start: 2024-11-04 | End: 2024-11-04

## 2024-11-04 RX ORDER — IOPAMIDOL 755 MG/ML
75 INJECTION, SOLUTION INTRAVASCULAR
Status: COMPLETED | OUTPATIENT
Start: 2024-11-04 | End: 2024-11-04

## 2024-11-04 RX ADMIN — SODIUM CHLORIDE, PRESERVATIVE FREE 10 ML: 5 INJECTION INTRAVENOUS at 11:08

## 2024-11-04 RX ADMIN — IOPAMIDOL 75 ML: 755 INJECTION, SOLUTION INTRAVENOUS at 11:08

## 2024-11-04 RX ADMIN — SODIUM CHLORIDE 100 ML: 9 INJECTION, SOLUTION INTRAVENOUS at 11:08

## 2024-12-09 ENCOUNTER — OFFICE VISIT (OUTPATIENT)
Dept: NEUROLOGY | Age: 69
End: 2024-12-09
Payer: MEDICARE

## 2024-12-09 VITALS
DIASTOLIC BLOOD PRESSURE: 83 MMHG | WEIGHT: 186 LBS | HEIGHT: 70 IN | BODY MASS INDEX: 26.63 KG/M2 | SYSTOLIC BLOOD PRESSURE: 136 MMHG | HEART RATE: 62 BPM

## 2024-12-09 DIAGNOSIS — I67.1 CEREBRAL ANEURYSM: Primary | ICD-10-CM

## 2024-12-09 DIAGNOSIS — I16.0 HYPERTENSIVE URGENCY: ICD-10-CM

## 2024-12-09 PROCEDURE — G8419 CALC BMI OUT NRM PARAM NOF/U: HCPCS | Performed by: PSYCHIATRY & NEUROLOGY

## 2024-12-09 PROCEDURE — 1124F ACP DISCUSS-NO DSCNMKR DOCD: CPT | Performed by: PSYCHIATRY & NEUROLOGY

## 2024-12-09 PROCEDURE — 99215 OFFICE O/P EST HI 40 MIN: CPT | Performed by: PSYCHIATRY & NEUROLOGY

## 2024-12-09 PROCEDURE — 3079F DIAST BP 80-89 MM HG: CPT | Performed by: PSYCHIATRY & NEUROLOGY

## 2024-12-09 PROCEDURE — G8484 FLU IMMUNIZE NO ADMIN: HCPCS | Performed by: PSYCHIATRY & NEUROLOGY

## 2024-12-09 PROCEDURE — G8427 DOCREV CUR MEDS BY ELIG CLIN: HCPCS | Performed by: PSYCHIATRY & NEUROLOGY

## 2024-12-09 PROCEDURE — 1036F TOBACCO NON-USER: CPT | Performed by: PSYCHIATRY & NEUROLOGY

## 2024-12-09 PROCEDURE — 3017F COLORECTAL CA SCREEN DOC REV: CPT | Performed by: PSYCHIATRY & NEUROLOGY

## 2024-12-09 PROCEDURE — 1159F MED LIST DOCD IN RCRD: CPT | Performed by: PSYCHIATRY & NEUROLOGY

## 2024-12-09 PROCEDURE — 3075F SYST BP GE 130 - 139MM HG: CPT | Performed by: PSYCHIATRY & NEUROLOGY

## 2024-12-09 RX ORDER — ASPIRIN 81 MG/1
81 TABLET ORAL DAILY
COMMUNITY

## 2024-12-09 RX ORDER — PAPAVER/PHENTOLAMINE/ALPROSTAD 150-5-50
1 VIAL (EA) INTRACAVERNOSAL AS NEEDED
COMMUNITY

## 2024-12-09 RX ORDER — GUSELKUMAB 100 MG/ML
1 INJECTION SUBCUTANEOUS
COMMUNITY
Start: 2024-12-04

## 2024-12-09 NOTE — PROGRESS NOTES
or back pain    NEUROLOGICAL:  Negative for weakness and tingling  negative for headaches and dizziness    PSYCHIATRIC:  negative for anxiety      Review of systems otherwise negative.      OBJECTIVE:     Vitals:    24 1503   BP: 136/83   Pulse: 62        General:  Gen: normal habitus, NAD  HEENT: NCAT, mucosa moist  Cvs: RRR, S1 S2 normal  Resp: symmetric unlabored breathing  Abd: s/nd/nt  Ext: no edema  Skin: no lesions seen, warm and dry    Neuro:  Gen: awake and alert, oriented x3.   Lang/speech: no aphasia or dysarthria. Follows commands.  CN: PERRL, EOMI, VFF, V1-3 intact, face symmetric, hearing intact, shoulder shrug symmetric, tongue midline  Motor: grossly 5/5 UE and LE b/l  Sense: LT intact in all 4 ext.  Coord: FTN and HTS intact b/l  DTR: deferred  Gait: narrow base gait    NIH Stroke Scale:   1a  Level of consciousness: 0 - alert; keenly responsive   1b. LOC questions:  0 - answers both questions correctly   1c. LOC commands: 0 - performs both tasks correctly   2.  Best Gaze: 0 - normal   3. Visual: 0 - no visual loss   4. Facial Palsy: 0 - normal symmetric movement   5a. Motor left arm: 0 - no drift, limb holds 90 (or 45) degrees for full 10 seconds   5b.  Motor right arm: 0 - no drift, limb holds 90 (or 45) degrees for full 10 seconds   6a. Motor left le - no drift; leg holds 30 degree position for full 5 seconds   6b  Motor right le - no drift; leg holds 30 degree position for full 5 seconds   7. Limb Ataxia: 0 - absent   8.  Sensory: 0 - normal; no sensory loss   9. Best Language:  0 - no aphasia, normal   10. Dysarthria: 0 - normal   11. Extinction and Inattention: 0 - no abnormality         Total:   0     MRS: 0      LABS:   Reviewed.  Lab Results   Component Value Date    HGB 16.9 2021    WBC 5.5 2021     2021     2021    BUN 16 2021    CREATININE 0.6 10/12/2023    AST 16 2020    ALT 18 2020    MG 2.3 2020      Lab

## 2025-01-16 ENCOUNTER — TRANSCRIBE ORDERS (OUTPATIENT)
Dept: ADMINISTRATIVE | Age: 70
End: 2025-01-16

## 2025-01-16 DIAGNOSIS — R79.89 ELEVATED TSH: Primary | ICD-10-CM

## 2025-01-20 ENCOUNTER — HOSPITAL ENCOUNTER (OUTPATIENT)
Dept: ULTRASOUND IMAGING | Age: 70
Discharge: HOME | End: 2025-01-22
Attending: FAMILY MEDICINE
Payer: MEDICARE

## 2025-01-20 DIAGNOSIS — R79.89 ELEVATED TSH: ICD-10-CM

## 2025-01-20 PROCEDURE — 76536 US EXAM OF HEAD AND NECK: CPT

## 2025-05-23 ENCOUNTER — TRANSCRIBE ORDERS (OUTPATIENT)
Dept: ADMINISTRATIVE | Age: 70
End: 2025-05-23

## 2025-05-23 DIAGNOSIS — M25.512 ACUTE PAIN OF LEFT SHOULDER: Primary | ICD-10-CM

## 2025-05-23 DIAGNOSIS — R20.2 TINGLING OF LEFT UPPER EXTREMITY: ICD-10-CM

## 2025-05-23 DIAGNOSIS — M50.30 DEGENERATIVE DISC DISEASE, CERVICAL: ICD-10-CM

## 2025-05-23 DIAGNOSIS — M54.12 LEFT CERVICAL RADICULOPATHY: ICD-10-CM

## 2025-05-29 ENCOUNTER — HOSPITAL ENCOUNTER (OUTPATIENT)
Dept: MRI IMAGING | Age: 70
Discharge: HOME | End: 2025-05-31

## 2025-05-29 DIAGNOSIS — M50.30 DEGENERATIVE DISC DISEASE, CERVICAL: ICD-10-CM

## 2025-05-29 DIAGNOSIS — M54.12 LEFT CERVICAL RADICULOPATHY: ICD-10-CM

## 2025-05-29 DIAGNOSIS — R20.2 TINGLING OF LEFT UPPER EXTREMITY: ICD-10-CM

## 2025-05-29 DIAGNOSIS — M25.512 ACUTE PAIN OF LEFT SHOULDER: ICD-10-CM

## 2025-06-02 NOTE — PROGRESS NOTES
Patient scheduled with Dr. Turner on 6/5/25 for left shoulder pain.  Writer spoke with patient explaining an appointment with a shoulder provider would be more beneficial.   Patient was rescheduled with Dr. Roberts for 6/6/25 at 9am.

## 2025-06-06 ENCOUNTER — OFFICE VISIT (OUTPATIENT)
Dept: ORTHOPEDIC SURGERY | Age: 70
End: 2025-06-06
Payer: MEDICARE

## 2025-06-06 VITALS — WEIGHT: 195 LBS | RESPIRATION RATE: 14 BRPM | BODY MASS INDEX: 27.92 KG/M2 | HEIGHT: 70 IN

## 2025-06-06 DIAGNOSIS — M25.512 LEFT SHOULDER PAIN, UNSPECIFIED CHRONICITY: Primary | ICD-10-CM

## 2025-06-06 PROCEDURE — 1124F ACP DISCUSS-NO DSCNMKR DOCD: CPT | Performed by: ORTHOPAEDIC SURGERY

## 2025-06-06 PROCEDURE — 1159F MED LIST DOCD IN RCRD: CPT | Performed by: ORTHOPAEDIC SURGERY

## 2025-06-06 PROCEDURE — G8419 CALC BMI OUT NRM PARAM NOF/U: HCPCS | Performed by: ORTHOPAEDIC SURGERY

## 2025-06-06 PROCEDURE — 1125F AMNT PAIN NOTED PAIN PRSNT: CPT | Performed by: ORTHOPAEDIC SURGERY

## 2025-06-06 PROCEDURE — 1036F TOBACCO NON-USER: CPT | Performed by: ORTHOPAEDIC SURGERY

## 2025-06-06 PROCEDURE — 3017F COLORECTAL CA SCREEN DOC REV: CPT | Performed by: ORTHOPAEDIC SURGERY

## 2025-06-06 PROCEDURE — G8427 DOCREV CUR MEDS BY ELIG CLIN: HCPCS | Performed by: ORTHOPAEDIC SURGERY

## 2025-06-06 PROCEDURE — 99203 OFFICE O/P NEW LOW 30 MIN: CPT | Performed by: ORTHOPAEDIC SURGERY

## 2025-06-06 NOTE — PROGRESS NOTES
Orthopedic Shoulder Encounter Note     Chief complaint: left shoulder pain    HPI: Leon Royal is a 70 y.o.  right-hand dominant male who presents for evaluation of his left shoulder.  He states that he has been dealing with pain for the past 5 weeks.  No precipitating trauma or injury.  His pain is fairly constant but fluctuates in intensity.  It is primarily localized to the posterior aspect of the shoulder in the shoulder blade region.  He does describe having pain shooting down from his neck on the left side into the posterior and superior shoulder and also radiating pain down his arm associated with some numbness and tingling involving the thumb, index finger, and middle finger.  He has been treated thus far with prescription strength ibuprofen, gabapentin, and methocarbamol.    Previous treatment:    NSAIDs: Ibuprofen 800    Physical Therapy: Yes, 1 session thus far    Injections: None    Surgeries: None    Review of Systems:   Constitutional: Negative for fever, chills, sweats.   Pain Score:   6  Musculoskeletal: As noted in HPI     Past Medical History  Leon  has a past medical history of Diabetes mellitus (HCC), ED (erectile dysfunction), GERD (gastroesophageal reflux disease), History of BPH, Hypertension, Mitral valve prolapse, and Psoriasis.    Past Surgical History  Leon  has a past surgical history that includes Cholecystectomy; hernia repair; Knee arthroscopy (Right, 2017); Upper gastrointestinal endoscopy (N/A, 6/26/2020); Colonoscopy (N/A, 8/23/2021); Endoscopy, colon, diagnostic; and other surgical history (11/30/2021).    Current Medications  Current Outpatient Medications   Medication Sig Dispense Refill    TREMFYA 100 MG/ML SOAJ Inject 1 mL into the skin every 28 days      Papav-Phentolamine-Alprostadil (TRI-MIX) 150-5-50 MG-MG-MCG SOLR Inject 1 mL into the muscle as needed      aspirin 81 MG EC tablet Take 1 tablet by mouth daily      fluocinonide (LIDEX) 0.05 % cream as needed

## 2025-06-19 ENCOUNTER — HOSPITAL ENCOUNTER (OUTPATIENT)
Dept: MRI IMAGING | Age: 70
Discharge: HOME | End: 2025-06-21
Payer: MEDICARE

## 2025-06-19 PROCEDURE — 72141 MRI NECK SPINE W/O DYE: CPT

## 2025-07-08 ENCOUNTER — OFFICE VISIT (OUTPATIENT)
Dept: ORTHOPEDIC SURGERY | Age: 70
End: 2025-07-08
Payer: MEDICARE

## 2025-07-08 VITALS — WEIGHT: 195 LBS | RESPIRATION RATE: 14 BRPM | HEIGHT: 70 IN | BODY MASS INDEX: 27.92 KG/M2

## 2025-07-08 DIAGNOSIS — M54.12 CERVICAL RADICULOPATHY: Primary | ICD-10-CM

## 2025-07-08 PROCEDURE — 99213 OFFICE O/P EST LOW 20 MIN: CPT | Performed by: ORTHOPAEDIC SURGERY

## 2025-07-08 PROCEDURE — G8428 CUR MEDS NOT DOCUMENT: HCPCS | Performed by: ORTHOPAEDIC SURGERY

## 2025-07-08 PROCEDURE — 3017F COLORECTAL CA SCREEN DOC REV: CPT | Performed by: ORTHOPAEDIC SURGERY

## 2025-07-08 PROCEDURE — G8419 CALC BMI OUT NRM PARAM NOF/U: HCPCS | Performed by: ORTHOPAEDIC SURGERY

## 2025-07-08 PROCEDURE — 1124F ACP DISCUSS-NO DSCNMKR DOCD: CPT | Performed by: ORTHOPAEDIC SURGERY

## 2025-07-08 PROCEDURE — 1036F TOBACCO NON-USER: CPT | Performed by: ORTHOPAEDIC SURGERY

## 2025-07-08 NOTE — PROGRESS NOTES
DIAGNOSTIC      HERNIA REPAIR      x 3    KNEE ARTHROSCOPY Right 2017    OTHER SURGICAL HISTORY  11/30/2021    cerebral angiogram    UPPER GASTROINTESTINAL ENDOSCOPY N/A 6/26/2020    EGD DILATION & BIOPSY performed by Markus Renner MD at [facility]     Family History   Problem Relation Age of Onset    Breast Cancer Mother     Diabetes Mother     Coronary Art Dis Father     Lung Cancer Father     Prostate Cancer Father         Physical Exam:  Vitals signs and nursing note reviewed.   Constitutional:       Appearance: well-developed.   HENT:      Head: Normocephalic and atraumatic.      Nose: Nose normal.   Eyes:      Conjunctiva/sclera: Conjunctivae normal.   Neck:      Musculoskeletal: Normal range of motion and neck supple.   Pulmonary:      Effort: Pulmonary effort is normal. No respiratory distress.   Musculoskeletal:      Comments: Normal gait     Skin:     General: Skin is warm and dry.   Neurological:      Mental Status: Alert and oriented to person, place, and time.      Sensory: No sensory deficit.   Psychiatric:         Behavior: Behavior normal.         Thought Content: Thought content normal.    Examination left shoulder very minimal impingement normal range of motion    Positive Spurling's and aggravation of usual and customary symptoms with neck extension       Scribe Attestation      By signing my name below, I, John Sewell, attest that this documentation has been prepared under the direction and in the presence of Dr. Cory Aguilar. Electronically signed: Radha Beck        Physician Attestation     I, Cory Aguilar, personally performed the services described in this documentation. All medical record entries made by the scribe were at my direction and in my presence. I have reviewed the chart and discharge instructions and agree that the records reflect my personal performance and is accurate and complete. Cory Aguilar MD 7/8/25           Please note that this chart was generated

## (undated) DEVICE — BITEBLOCK 54FR W/ DENT RIM BLOX

## (undated) DEVICE — ENDO KIT W/SYRINGE: Brand: MEDLINE INDUSTRIES, INC.

## (undated) DEVICE — GLOVE ORANGE PI 7 1/2   MSG9075

## (undated) DEVICE — DEFENDO AIR WATER SUCTION AND BIOPSY VALVE KIT FOR  OLYMPUS: Brand: DEFENDO AIR/WATER/SUCTION AND BIOPSY VALVE

## (undated) DEVICE — FORCEPS BX L240CM WRK CHN 2.8MM STD CAP W/ NDL MIC MESH

## (undated) DEVICE — GLOVE ORANGE PI 7   MSG9070